# Patient Record
Sex: FEMALE | Race: WHITE | NOT HISPANIC OR LATINO | Employment: OTHER | URBAN - METROPOLITAN AREA
[De-identification: names, ages, dates, MRNs, and addresses within clinical notes are randomized per-mention and may not be internally consistent; named-entity substitution may affect disease eponyms.]

---

## 2023-11-10 ENCOUNTER — APPOINTMENT (OUTPATIENT)
Dept: RADIOLOGY | Facility: HOSPITAL | Age: 78
End: 2023-11-10
Payer: MEDICARE

## 2023-11-10 ENCOUNTER — PHARMACY VISIT (OUTPATIENT)
Dept: PHARMACY | Facility: CLINIC | Age: 78
End: 2023-11-10

## 2023-11-10 ENCOUNTER — HOSPITAL ENCOUNTER (EMERGENCY)
Facility: HOSPITAL | Age: 78
Discharge: HOME | End: 2023-11-10
Payer: MEDICARE

## 2023-11-10 VITALS
HEIGHT: 64 IN | HEART RATE: 82 BPM | WEIGHT: 165 LBS | OXYGEN SATURATION: 98 % | RESPIRATION RATE: 16 BRPM | SYSTOLIC BLOOD PRESSURE: 171 MMHG | TEMPERATURE: 97.2 F | DIASTOLIC BLOOD PRESSURE: 89 MMHG | BODY MASS INDEX: 28.17 KG/M2

## 2023-11-10 DIAGNOSIS — N39.0 ACUTE UTI: ICD-10-CM

## 2023-11-10 DIAGNOSIS — S39.012A STRAIN OF LUMBAR REGION, INITIAL ENCOUNTER: Primary | ICD-10-CM

## 2023-11-10 LAB
APPEARANCE UR: CLEAR
BILIRUB UR STRIP.AUTO-MCNC: NEGATIVE MG/DL
COLOR UR: YELLOW
GLUCOSE UR STRIP.AUTO-MCNC: NEGATIVE MG/DL
HOLD SPECIMEN: NORMAL
KETONES UR STRIP.AUTO-MCNC: NEGATIVE MG/DL
LEUKOCYTE ESTERASE UR QL STRIP.AUTO: ABNORMAL
NITRITE UR QL STRIP.AUTO: NEGATIVE
PH UR STRIP.AUTO: 6 [PH]
PROT UR STRIP.AUTO-MCNC: NEGATIVE MG/DL
RBC # UR STRIP.AUTO: NEGATIVE /UL
RBC #/AREA URNS AUTO: NORMAL /HPF
SP GR UR STRIP.AUTO: 1.01
SQUAMOUS #/AREA URNS AUTO: NORMAL /HPF
UROBILINOGEN UR STRIP.AUTO-MCNC: 2 MG/DL
WBC #/AREA URNS AUTO: NORMAL /HPF

## 2023-11-10 PROCEDURE — 99285 EMERGENCY DEPT VISIT HI MDM: CPT | Mod: 25

## 2023-11-10 PROCEDURE — 72100 X-RAY EXAM L-S SPINE 2/3 VWS: CPT | Performed by: RADIOLOGY

## 2023-11-10 PROCEDURE — 2500000004 HC RX 250 GENERAL PHARMACY W/ HCPCS (ALT 636 FOR OP/ED): Performed by: NURSE PRACTITIONER

## 2023-11-10 PROCEDURE — 81001 URINALYSIS AUTO W/SCOPE: CPT | Performed by: NURSE PRACTITIONER

## 2023-11-10 PROCEDURE — 99283 EMERGENCY DEPT VISIT LOW MDM: CPT | Mod: 25

## 2023-11-10 PROCEDURE — 72100 X-RAY EXAM L-S SPINE 2/3 VWS: CPT

## 2023-11-10 PROCEDURE — 2500000005 HC RX 250 GENERAL PHARMACY W/O HCPCS: Performed by: NURSE PRACTITIONER

## 2023-11-10 PROCEDURE — RXMED WILLOW AMBULATORY MEDICATION CHARGE

## 2023-11-10 PROCEDURE — 87086 URINE CULTURE/COLONY COUNT: CPT | Mod: PORLAB | Performed by: NURSE PRACTITIONER

## 2023-11-10 PROCEDURE — 96372 THER/PROPH/DIAG INJ SC/IM: CPT

## 2023-11-10 RX ORDER — LIDOCAINE 50 MG/G
1 PATCH TOPICAL DAILY
Qty: 10 PATCH | Refills: 0 | Status: SHIPPED | OUTPATIENT
Start: 2023-11-10

## 2023-11-10 RX ORDER — METHOCARBAMOL 500 MG/1
500 TABLET, FILM COATED ORAL 2 TIMES DAILY
Qty: 20 TABLET | Refills: 0 | Status: SHIPPED | OUTPATIENT
Start: 2023-11-10 | End: 2023-11-20

## 2023-11-10 RX ORDER — LIDOCAINE 560 MG/1
1 PATCH PERCUTANEOUS; TOPICAL; TRANSDERMAL ONCE
Status: DISCONTINUED | OUTPATIENT
Start: 2023-11-10 | End: 2023-11-10 | Stop reason: HOSPADM

## 2023-11-10 RX ORDER — KETOROLAC TROMETHAMINE 30 MG/ML
15 INJECTION, SOLUTION INTRAMUSCULAR; INTRAVENOUS ONCE
Status: COMPLETED | OUTPATIENT
Start: 2023-11-10 | End: 2023-11-10

## 2023-11-10 RX ORDER — PREDNISONE 20 MG/1
40 TABLET ORAL DAILY
Qty: 10 TABLET | Refills: 0 | Status: SHIPPED | OUTPATIENT
Start: 2023-11-10 | End: 2023-11-15

## 2023-11-10 RX ORDER — ORPHENADRINE CITRATE 30 MG/ML
30 INJECTION INTRAMUSCULAR; INTRAVENOUS ONCE
Status: COMPLETED | OUTPATIENT
Start: 2023-11-10 | End: 2023-11-10

## 2023-11-10 RX ADMIN — ORPHENADRINE CITRATE 30 MG: 60 INJECTION INTRAMUSCULAR; INTRAVENOUS at 09:05

## 2023-11-10 RX ADMIN — LIDOCAINE 1 PATCH: 4 PATCH TOPICAL at 09:06

## 2023-11-10 RX ADMIN — KETOROLAC TROMETHAMINE 15 MG: 30 INJECTION, SOLUTION INTRAMUSCULAR at 09:04

## 2023-11-10 ASSESSMENT — PAIN - FUNCTIONAL ASSESSMENT: PAIN_FUNCTIONAL_ASSESSMENT: 0-10

## 2023-11-10 ASSESSMENT — COLUMBIA-SUICIDE SEVERITY RATING SCALE - C-SSRS
2. HAVE YOU ACTUALLY HAD ANY THOUGHTS OF KILLING YOURSELF?: NO
6. HAVE YOU EVER DONE ANYTHING, STARTED TO DO ANYTHING, OR PREPARED TO DO ANYTHING TO END YOUR LIFE?: NO
1. IN THE PAST MONTH, HAVE YOU WISHED YOU WERE DEAD OR WISHED YOU COULD GO TO SLEEP AND NOT WAKE UP?: NO

## 2023-11-10 ASSESSMENT — PAIN SCALES - GENERAL: PAINLEVEL_OUTOF10: 5 - MODERATE PAIN

## 2023-11-10 ASSESSMENT — PAIN DESCRIPTION - PAIN TYPE: TYPE: ACUTE PAIN

## 2023-11-10 ASSESSMENT — PAIN DESCRIPTION - LOCATION: LOCATION: BACK

## 2023-11-10 NOTE — ED PROVIDER NOTES
HPI   No chief complaint on file.      This is a 78-year-old  female presenting to the emergency room with complaints of lower back pain for the past 2 weeks.  The patient denies any traumatic injury.  The pain radiates across the entire lower back.  She denies any alteration in her urine or bowel function.  She reports that whenever she has back pain she has been told that she has had a urinary tract infection.  The patient took Tylenol with no relief of her pain symptoms.  She has a history of thoracic back pain from prior injury.  She denies any strenuous activity.  She is not experience any fever or cold-like symptoms.  She denies any spinal injections.                          No data recorded                Patient History   History reviewed. No pertinent past medical history.  History reviewed. No pertinent surgical history.  No family history on file.  Social History     Tobacco Use    Smoking status: Not on file    Smokeless tobacco: Not on file   Substance Use Topics    Alcohol use: Not on file    Drug use: Not on file       Physical Exam   ED Triage Vitals [11/10/23 0807]   Temp Heart Rate Resp BP   36.2 °C (97.2 °F) 82 16 171/89      SpO2 Temp Source Heart Rate Source Patient Position   98 % Temporal -- --      BP Location FiO2 (%)     -- --       Physical Exam  Vitals and nursing note reviewed.   HENT:      Head: Normocephalic and atraumatic.      Right Ear: External ear normal.      Mouth/Throat:      Pharynx: Oropharynx is clear.   Eyes:      Conjunctiva/sclera: Conjunctivae normal.   Cardiovascular:      Rate and Rhythm: Normal rate and regular rhythm.      Pulses: Normal pulses.      Heart sounds: Normal heart sounds.   Abdominal:      General: Bowel sounds are normal.      Palpations: Abdomen is soft.   Musculoskeletal:      Cervical back: Normal range of motion.      Comments: The patient has midline lower and paraspinal tenderness with a negative straight leg test.  The patient also has  paravertebral muscle tenderness bilaterally to the lumbar spinal region.  No cervical thoracic spinal tenderness.  Moves all extremities with normal range of motion and muscle strength.  Gait is stable.   Skin:     General: Skin is warm and dry.   Neurological:      General: No focal deficit present.      Mental Status: She is alert and oriented to person, place, and time.   Psychiatric:         Mood and Affect: Mood normal.         ED Course & MDM   Diagnoses as of 11/10/23 1142   Strain of lumbar region, initial encounter       Medical Decision Making  Patient was seen and evaluated by the nurse practitioner, Doreen Joshua.  The patient is presenting to the emergency room with complaints of lower back pain.  She denies any specific injury.  She states that she does assist her  who has physical limitations.  She might of strained it at that time.  The patient has no evidence of cauda equina syndrome or neurological deficit.  An x-ray of the lumbar spine show discogenic degenerative changes with no acute fracture or subluxation.  The patient was able to ambulate without difficulties.  She was medicated with 15 mg of Toradol IM, 30 mg of Flexeril IM, and a Lidoderm patch was placed on the affected area.  She reported some improvement of her pain symptoms.  The patient was educated on rice therapy and is to avoid any activities that will exacerbate her pain.  She was provided prescriptions for Robaxin, prednisone, and Lidoderm patches for home administration.  She is to follow-up with her primary care physician in the next 2 to 3 days.  She is to return if worse in any way.  The patient was discharged in stable condition with computer discharge instructions given.  The patient was agreeable with discharge plan.        Procedure  Procedures     XANDER Dupont-RUCHI  11/10/23 1141

## 2023-11-13 LAB — BACTERIA UR CULT: ABNORMAL

## 2023-11-14 ENCOUNTER — PHARMACY VISIT (OUTPATIENT)
Dept: PHARMACY | Facility: CLINIC | Age: 78
End: 2023-11-14
Payer: COMMERCIAL

## 2023-11-14 ENCOUNTER — TELEPHONE (OUTPATIENT)
Dept: PHARMACY | Facility: HOSPITAL | Age: 78
End: 2023-11-14
Payer: MEDICARE

## 2023-11-14 PROCEDURE — RXMED WILLOW AMBULATORY MEDICATION CHARGE

## 2023-11-14 RX ORDER — CEFUROXIME AXETIL 500 MG/1
500 TABLET ORAL 2 TIMES DAILY
Qty: 20 TABLET | Refills: 0 | Status: SHIPPED | OUTPATIENT
Start: 2023-11-14 | End: 2023-11-24

## 2023-11-14 NOTE — PROGRESS NOTES
EDPD Note: Initiation     Contacted Jenifer Means regarding a positive urine culture that was taken during their recent emergency room visit. I completed education with patient . The patient was not being treated appropriately. Reached out to the [facility] inpatient pharmacist due to lack of drugs susceptible to the bug found in the urine, Streptococcus bovis/gallolyticus group. Urine culture came back only susceptible to vancomycin and ceftriaxone and intermediate for penicillins and tetracylines. Patient stated having some back pain which is usual with her UTIs. I potentially wanted to send her back to the ED. The [facility] inpatient pharmacist reached out to the nurse practitioner who sent the patient a prescription for cefuroxime to the [place] outpatient pharmacy. Made the patient aware of the results and counseled her on the new medication that was sent. Encouraged patient to return to the ED, PCP, or urgent care if the symptoms worsen or do not resolve after treatment. No further follow up needed from EDPD Team.     Drug: Cefuroxime 500 mg   Sig: Take 1 tablet by mouth twice daily for 10 days  Qty: 20  Days Supply: 10    If there are any other questions for the ED Post-Discharge Culture Follow Up Team, please contact [phone]. Fax: [phone].    Merissa Durand, RazaD

## 2024-11-15 ENCOUNTER — HOSPITAL ENCOUNTER (EMERGENCY)
Facility: HOSPITAL | Age: 79
Discharge: HOME | End: 2024-11-15
Attending: STUDENT IN AN ORGANIZED HEALTH CARE EDUCATION/TRAINING PROGRAM
Payer: MEDICARE

## 2024-11-15 ENCOUNTER — PHARMACY VISIT (OUTPATIENT)
Dept: PHARMACY | Facility: CLINIC | Age: 79
End: 2024-11-15
Payer: COMMERCIAL

## 2024-11-15 ENCOUNTER — APPOINTMENT (OUTPATIENT)
Dept: RADIOLOGY | Facility: HOSPITAL | Age: 79
End: 2024-11-15
Payer: MEDICARE

## 2024-11-15 VITALS
RESPIRATION RATE: 16 BRPM | WEIGHT: 176 LBS | OXYGEN SATURATION: 97 % | HEART RATE: 70 BPM | BODY MASS INDEX: 29.32 KG/M2 | HEIGHT: 65 IN | DIASTOLIC BLOOD PRESSURE: 70 MMHG | SYSTOLIC BLOOD PRESSURE: 173 MMHG | TEMPERATURE: 97.5 F

## 2024-11-15 DIAGNOSIS — J43.9 PULMONARY EMPHYSEMA, UNSPECIFIED EMPHYSEMA TYPE (MULTI): Primary | ICD-10-CM

## 2024-11-15 LAB
ANION GAP SERPL CALC-SCNC: 12 MMOL/L (ref 10–20)
BASOPHILS # BLD AUTO: 0.04 X10*3/UL (ref 0–0.1)
BASOPHILS NFR BLD AUTO: 0.8 %
BUN SERPL-MCNC: 12 MG/DL (ref 6–23)
CALCIUM SERPL-MCNC: 9.5 MG/DL (ref 8.6–10.3)
CHLORIDE SERPL-SCNC: 97 MMOL/L (ref 98–107)
CO2 SERPL-SCNC: 31 MMOL/L (ref 21–32)
CREAT SERPL-MCNC: 0.83 MG/DL (ref 0.5–1.05)
EGFRCR SERPLBLD CKD-EPI 2021: 73 ML/MIN/1.73M*2
EOSINOPHIL # BLD AUTO: 0.05 X10*3/UL (ref 0–0.4)
EOSINOPHIL NFR BLD AUTO: 1 %
ERYTHROCYTE [DISTWIDTH] IN BLOOD BY AUTOMATED COUNT: 13.7 % (ref 11.5–14.5)
FLUAV RNA RESP QL NAA+PROBE: NOT DETECTED
FLUBV RNA RESP QL NAA+PROBE: NOT DETECTED
GLUCOSE SERPL-MCNC: 103 MG/DL (ref 74–99)
HCT VFR BLD AUTO: 38.9 % (ref 36–46)
HGB BLD-MCNC: 12.6 G/DL (ref 12–16)
IMM GRANULOCYTES # BLD AUTO: 0.03 X10*3/UL (ref 0–0.5)
IMM GRANULOCYTES NFR BLD AUTO: 0.6 % (ref 0–0.9)
LACTATE SERPL-SCNC: 1 MMOL/L (ref 0.4–2)
LYMPHOCYTES # BLD AUTO: 1.45 X10*3/UL (ref 0.8–3)
LYMPHOCYTES NFR BLD AUTO: 29.8 %
MCH RBC QN AUTO: 26.8 PG (ref 26–34)
MCHC RBC AUTO-ENTMCNC: 32.4 G/DL (ref 32–36)
MCV RBC AUTO: 83 FL (ref 80–100)
MONOCYTES # BLD AUTO: 0.44 X10*3/UL (ref 0.05–0.8)
MONOCYTES NFR BLD AUTO: 9.1 %
NEUTROPHILS # BLD AUTO: 2.85 X10*3/UL (ref 1.6–5.5)
NEUTROPHILS NFR BLD AUTO: 58.7 %
NRBC BLD-RTO: 0 /100 WBCS (ref 0–0)
PLATELET # BLD AUTO: 282 X10*3/UL (ref 150–450)
POTASSIUM SERPL-SCNC: 3.6 MMOL/L (ref 3.5–5.3)
RBC # BLD AUTO: 4.71 X10*6/UL (ref 4–5.2)
RSV RNA RESP QL NAA+PROBE: NOT DETECTED
SARS-COV-2 RNA RESP QL NAA+PROBE: NOT DETECTED
SODIUM SERPL-SCNC: 136 MMOL/L (ref 136–145)
WBC # BLD AUTO: 4.9 X10*3/UL (ref 4.4–11.3)

## 2024-11-15 PROCEDURE — 85025 COMPLETE CBC W/AUTO DIFF WBC: CPT | Performed by: NURSE PRACTITIONER

## 2024-11-15 PROCEDURE — 36415 COLL VENOUS BLD VENIPUNCTURE: CPT | Performed by: NURSE PRACTITIONER

## 2024-11-15 PROCEDURE — 71046 X-RAY EXAM CHEST 2 VIEWS: CPT | Mod: FOREIGN READ | Performed by: RADIOLOGY

## 2024-11-15 PROCEDURE — 94640 AIRWAY INHALATION TREATMENT: CPT

## 2024-11-15 PROCEDURE — 80048 BASIC METABOLIC PNL TOTAL CA: CPT | Performed by: NURSE PRACTITIONER

## 2024-11-15 PROCEDURE — 83605 ASSAY OF LACTIC ACID: CPT | Performed by: NURSE PRACTITIONER

## 2024-11-15 PROCEDURE — 2500000002 HC RX 250 W HCPCS SELF ADMINISTERED DRUGS (ALT 637 FOR MEDICARE OP, ALT 636 FOR OP/ED): Performed by: NURSE PRACTITIONER

## 2024-11-15 PROCEDURE — 87635 SARS-COV-2 COVID-19 AMP PRB: CPT | Performed by: NURSE PRACTITIONER

## 2024-11-15 PROCEDURE — 71046 X-RAY EXAM CHEST 2 VIEWS: CPT

## 2024-11-15 PROCEDURE — 99283 EMERGENCY DEPT VISIT LOW MDM: CPT | Mod: 25

## 2024-11-15 PROCEDURE — RXMED WILLOW AMBULATORY MEDICATION CHARGE

## 2024-11-15 RX ORDER — ALBUTEROL SULFATE 90 UG/1
2 INHALANT RESPIRATORY (INHALATION) EVERY 4 HOURS PRN
Qty: 6.7 G | Refills: 0 | Status: SHIPPED | OUTPATIENT
Start: 2024-11-15 | End: 2024-12-15

## 2024-11-15 RX ORDER — IPRATROPIUM BROMIDE AND ALBUTEROL SULFATE 2.5; .5 MG/3ML; MG/3ML
3 SOLUTION RESPIRATORY (INHALATION) ONCE
Status: COMPLETED | OUTPATIENT
Start: 2024-11-15 | End: 2024-11-15

## 2024-11-15 RX ORDER — GUAIFENESIN 600 MG/1
600 TABLET, EXTENDED RELEASE ORAL 2 TIMES DAILY
Qty: 14 TABLET | Refills: 0 | Status: SHIPPED | OUTPATIENT
Start: 2024-11-15 | End: 2024-11-22

## 2024-11-15 RX ORDER — BENZONATATE 100 MG/1
100 CAPSULE ORAL 3 TIMES DAILY PRN
Qty: 21 CAPSULE | Refills: 0 | Status: SHIPPED | OUTPATIENT
Start: 2024-11-15 | End: 2024-11-22

## 2024-11-15 ASSESSMENT — LIFESTYLE VARIABLES
EVER HAD A DRINK FIRST THING IN THE MORNING TO STEADY YOUR NERVES TO GET RID OF A HANGOVER: NO
EVER FELT BAD OR GUILTY ABOUT YOUR DRINKING: NO
TOTAL SCORE: 0
HAVE PEOPLE ANNOYED YOU BY CRITICIZING YOUR DRINKING: NO
HAVE YOU EVER FELT YOU SHOULD CUT DOWN ON YOUR DRINKING: NO

## 2024-11-15 ASSESSMENT — PAIN SCALES - GENERAL: PAINLEVEL_OUTOF10: 6

## 2024-11-15 ASSESSMENT — PAIN DESCRIPTION - LOCATION: LOCATION: BACK

## 2024-11-15 ASSESSMENT — COLUMBIA-SUICIDE SEVERITY RATING SCALE - C-SSRS
2. HAVE YOU ACTUALLY HAD ANY THOUGHTS OF KILLING YOURSELF?: NO
1. IN THE PAST MONTH, HAVE YOU WISHED YOU WERE DEAD OR WISHED YOU COULD GO TO SLEEP AND NOT WAKE UP?: NO
6. HAVE YOU EVER DONE ANYTHING, STARTED TO DO ANYTHING, OR PREPARED TO DO ANYTHING TO END YOUR LIFE?: NO

## 2024-11-15 ASSESSMENT — PAIN - FUNCTIONAL ASSESSMENT: PAIN_FUNCTIONAL_ASSESSMENT: 0-10

## 2024-11-15 ASSESSMENT — PAIN DESCRIPTION - DIRECTION: RADIATING_TOWARDS: HIP

## 2024-11-15 NOTE — ED PROVIDER NOTES
Chief Complaint   Patient presents with    Recurrent Pneumonia     Recent dx UTI and pneumonia.  On ABX.  Told to go to ED if sx worsening.  Pt and daughter report worsening pain and respiratory SX.       HPI       79 year old female presents to the Emergency Department today complaining of a 1 week history of nasal congestion, postnasal drip, shortness of breath, and a cough that is occasionally productive of clear sputum. Chest pain only present with coughing. Denies any associated fever, chills, headache, neck pain, abdominal pain, nausea, vomiting, diarrhea, constipation, or urinary symptoms. Recently evaluated at an urgent care and diagnosed with pneumonia and an UTI. Placed on Augmentin and Zithromax. States that the cough has gotten worse.       History provided by:  Patient             Patient History   No past medical history on file.  No past surgical history on file.  No family history on file.  Social History     Tobacco Use    Smoking status: Not on file    Smokeless tobacco: Not on file   Substance Use Topics    Alcohol use: Not on file    Drug use: Not on file           Physical Exam  Constitutional:       Appearance: Normal appearance.   HENT:      Head: Normocephalic.      Right Ear: External ear normal.      Left Ear: External ear normal.      Nose: Nose normal.      Mouth/Throat:      Lips: Pink.      Mouth: Mucous membranes are moist.      Dentition: No dental caries.      Pharynx: Oropharynx is clear. Uvula midline. No oropharyngeal exudate or posterior oropharyngeal erythema.      Tonsils: No tonsillar exudate. 1+ on the right. 1+ on the left.   Eyes:      Conjunctiva/sclera: Conjunctivae normal.      Pupils: Pupils are equal, round, and reactive to light.   Cardiovascular:      Rate and Rhythm: Normal rate and regular rhythm.      Heart sounds: No murmur heard.     No friction rub. No gallop.   Pulmonary:      Effort: Pulmonary effort is normal. No respiratory distress.      Breath sounds:  Normal breath sounds. No stridor. No wheezing, rhonchi or rales.   Neurological:      Mental Status: She is alert.         Labs Reviewed   BASIC METABOLIC PANEL - Abnormal       Result Value    Glucose 103 (*)     Sodium 136      Potassium 3.6      Chloride 97 (*)     Bicarbonate 31      Anion Gap 12      Urea Nitrogen 12      Creatinine 0.83      eGFR 73      Calcium 9.5     LACTATE - Normal    Lactate 1.0      Narrative:     Venipuncture immediately after or during the administration of Metamizole may lead to falsely low results. Testing should be performed immediately prior to Metamizole dosing.   SARS-COV-2 PCR - Normal    Coronavirus 2019, PCR Not Detected      Narrative:     This assay has received FDA Emergency Use Authorization (EUA) and is only authorized for the duration of time that circumstances exist to justify the authorization of the emergency use of in vitro diagnostic tests for the detection of SARS-CoV-2 virus and/or diagnosis of COVID-19 infection under section 564(b)(1) of the Act, 21 U.S.C. 360bbb-3(b)(1). This assay is an in vitro diagnostic nucleic acid amplification test for the qualitative detection of SARS-CoV-2 from nasopharyngeal specimens and has been validated for use at [facility]. Negative results do not preclude COVID-19 infections and should not be used as the sole basis for diagnosis, treatment, or other management decisions.     INFLUENZA A AND B PCR - Normal    Flu A Result Not Detected      Flu B Result Not Detected      Narrative:     This assay is an in vitro diagnostic multiplex nucleic acid amplification test for the detection and discrimination of Influenza A & B from nasopharyngeal specimens, and has been validated for use at [facility]. Negative results do not preclude Influenza A/B infections, and should not be used as the sole basis for diagnosis, treatment, or other management decisions. If Influenza A/B and RSV PCR  results are negative, testing for Parainfluenza virus, Adenovirus and Metapneumovirus is routinely performed for [facility] pediatric oncology and intensive care inpatients, and is available on other patients by placing an add-on request.   RSV PCR - Normal    RSV PCR Not Detected      Narrative:     This assay is an FDA-cleared, in vitro diagnostic nucleic acid amplification test for the detection of RSV from nasopharyngeal specimens, and has been validated for use at [facility]. Negative results do not preclude RSV infections, and should not be used as the sole basis for diagnosis, treatment, or other management decisions. If Influenza A/B and RSV PCR results are negative, testing for Parainfluenza virus, Adenovirus and Metapneumovirus is routinely performed for pediatric oncology and intensive care inpatients at [facility], and is available on other patients by placing an add-on request.       CBC WITH AUTO DIFFERENTIAL    WBC 4.9      nRBC 0.0      RBC 4.71      Hemoglobin 12.6      Hematocrit 38.9      MCV 83      MCH 26.8      MCHC 32.4      RDW 13.7      Platelets 282      Neutrophils % 58.7      Immature Granulocytes %, Automated 0.6      Lymphocytes % 29.8      Monocytes % 9.1      Eosinophils % 1.0      Basophils % 0.8      Neutrophils Absolute 2.85      Immature Granulocytes Absolute, Automated 0.03      Lymphocytes Absolute 1.45      Monocytes Absolute 0.44      Eosinophils Absolute 0.05      Basophils Absolute 0.04         XR chest 2 views   Final Result   Calcified granuloma.  Emphysematous changes.  No other acute process.   Signed by Jose Gamble DO               ED Course & MDM   Diagnoses as of 11/15/24 8664   Pulmonary emphysema, unspecified emphysema type (Multi)           Medical Decision Making  Patient was seen and evaluated by Dr. Lopez. Given a Duoneb with improvement in her shortness of breath. Repeat lung sounds remain unchanged. Saline lock was established with  labs drawn and results as above. Blood counts, electrolytes, kidney function, and lactate were unremarkable. Influenza, COVID, and RSV were all negative. CXR shows a calcified granuloma with emphysematous changes and no other acute process. We feel that her symptoms are secondary to a COPD exacerbation versus a viral URI. She may continue to take the Zithromax and Augmentin as previously directed. Allergic to Prednisone and does not want any steroids. Given prescriptions for Mucinex and an Albuterol inhaler. Follow up with their doctor in 3 days. Given a referral to pulmonology as well. Return if worse in any way. Discharged in stable condition with computer instructions.    Diagnostic Impression:     1. Acute exacerbation of COPD    2. Prescription therapy            Your medication list        ASK your doctor about these medications        Instructions Last Dose Given Next Dose Due   lidocaine 5 % patch  Commonly known as: Lidoderm      Place 1 patch on the skin in affected area and leave on for 12 hours. Remove for 12 hours       methocarbamol 500 mg tablet  Commonly known as: Robaxin      Take 1 tablet (500 mg) by mouth 2 times a day for 10 days.                  Procedure  Procedures     Messi Guthrie, XANDER-CNP  11/15/24 0361

## 2024-11-15 NOTE — ED TRIAGE NOTES
Pt recently diagnosed at urgent care with pneumonia and UTI.  Started abx on Tuesday of this week.  Was told to go to the hospital if symptoms worsened.  Per pt and daughter, pt has increase in pain and worsening upper respiratory symptoms, including cough.  A&O x4, ambulated independently to tx area.

## 2025-03-03 ENCOUNTER — HOSPITAL ENCOUNTER (INPATIENT)
Facility: HOSPITAL | Age: 80
LOS: 5 days | Discharge: HOME | End: 2025-03-09
Attending: STUDENT IN AN ORGANIZED HEALTH CARE EDUCATION/TRAINING PROGRAM | Admitting: INTERNAL MEDICINE
Payer: MEDICARE

## 2025-03-03 ENCOUNTER — APPOINTMENT (OUTPATIENT)
Dept: RADIOLOGY | Facility: HOSPITAL | Age: 80
DRG: 871 | End: 2025-03-03
Payer: MEDICARE

## 2025-03-03 DIAGNOSIS — J13 PNEUMONIA OF BOTH LOWER LOBES DUE TO STREPTOCOCCUS PNEUMONIAE (CMS-HCC): ICD-10-CM

## 2025-03-03 DIAGNOSIS — J96.01 ACUTE RESPIRATORY FAILURE WITH HYPOXIA (MULTI): ICD-10-CM

## 2025-03-03 DIAGNOSIS — J18.9 PNEUMONIA DUE TO INFECTIOUS ORGANISM, UNSPECIFIED LATERALITY, UNSPECIFIED PART OF LUNG: ICD-10-CM

## 2025-03-03 DIAGNOSIS — J11.1 FLU: Primary | ICD-10-CM

## 2025-03-03 LAB
ALBUMIN SERPL BCP-MCNC: 3.4 G/DL (ref 3.4–5)
ALP SERPL-CCNC: 87 U/L (ref 33–136)
ALT SERPL W P-5'-P-CCNC: 23 U/L (ref 7–45)
ANION GAP BLDV CALCULATED.4IONS-SCNC: 6 MMOL/L (ref 10–25)
ANION GAP SERPL CALC-SCNC: 12 MMOL/L (ref 10–20)
AST SERPL W P-5'-P-CCNC: 24 U/L (ref 9–39)
BASE EXCESS BLDV CALC-SCNC: 9.5 MMOL/L (ref -2–3)
BASOPHILS # BLD AUTO: 0.12 X10*3/UL (ref 0–0.1)
BASOPHILS NFR BLD AUTO: 0.9 %
BILIRUB SERPL-MCNC: 0.9 MG/DL (ref 0–1.2)
BNP SERPL-MCNC: 143 PG/ML (ref 0–99)
BODY TEMPERATURE: 37 DEGREES CELSIUS
BUN SERPL-MCNC: 18 MG/DL (ref 6–23)
CA-I BLDV-SCNC: 1.16 MMOL/L (ref 1.1–1.33)
CALCIUM SERPL-MCNC: 9.1 MG/DL (ref 8.6–10.3)
CARDIAC TROPONIN I PNL SERPL HS: 21 NG/L (ref 0–13)
CARDIAC TROPONIN I PNL SERPL HS: 23 NG/L (ref 0–13)
CHLORIDE BLDV-SCNC: 91 MMOL/L (ref 98–107)
CHLORIDE SERPL-SCNC: 91 MMOL/L (ref 98–107)
CO2 SERPL-SCNC: 33 MMOL/L (ref 21–32)
CREAT SERPL-MCNC: 0.69 MG/DL (ref 0.5–1.05)
EGFRCR SERPLBLD CKD-EPI 2021: 90 ML/MIN/1.73M*2
EOSINOPHIL # BLD AUTO: 0 X10*3/UL (ref 0–0.4)
EOSINOPHIL NFR BLD AUTO: 0 %
ERYTHROCYTE [DISTWIDTH] IN BLOOD BY AUTOMATED COUNT: 13.8 % (ref 11.5–14.5)
FLUAV RNA RESP QL NAA+PROBE: DETECTED
FLUBV RNA RESP QL NAA+PROBE: NOT DETECTED
GLUCOSE BLDV-MCNC: 126 MG/DL (ref 74–99)
GLUCOSE SERPL-MCNC: 119 MG/DL (ref 74–99)
HCO3 BLDV-SCNC: 35.3 MMOL/L (ref 22–26)
HCT VFR BLD AUTO: 37.2 % (ref 36–46)
HCT VFR BLD EST: 38 % (ref 36–46)
HGB BLD-MCNC: 12.1 G/DL (ref 12–16)
HGB BLDV-MCNC: 12.5 G/DL (ref 12–16)
IMM GRANULOCYTES # BLD AUTO: 0.4 X10*3/UL (ref 0–0.5)
IMM GRANULOCYTES NFR BLD AUTO: 3.1 % (ref 0–0.9)
INHALED O2 CONCENTRATION: 36 %
LACTATE BLDV-SCNC: 1.8 MMOL/L (ref 0.4–2)
LIPASE SERPL-CCNC: 10 U/L (ref 9–82)
LYMPHOCYTES # BLD AUTO: 0.42 X10*3/UL (ref 0.8–3)
LYMPHOCYTES NFR BLD AUTO: 3.2 %
MAGNESIUM SERPL-MCNC: 1.97 MG/DL (ref 1.6–2.4)
MCH RBC QN AUTO: 26.4 PG (ref 26–34)
MCHC RBC AUTO-ENTMCNC: 32.5 G/DL (ref 32–36)
MCV RBC AUTO: 81 FL (ref 80–100)
MONOCYTES # BLD AUTO: 0.75 X10*3/UL (ref 0.05–0.8)
MONOCYTES NFR BLD AUTO: 5.8 %
NEUTROPHILS # BLD AUTO: 11.28 X10*3/UL (ref 1.6–5.5)
NEUTROPHILS NFR BLD AUTO: 87 %
NRBC BLD-RTO: 0 /100 WBCS (ref 0–0)
OXYHGB MFR BLDV: 35.8 % (ref 45–75)
PCO2 BLDV: 52 MM HG (ref 41–51)
PH BLDV: 7.44 PH (ref 7.33–7.43)
PLATELET # BLD AUTO: 379 X10*3/UL (ref 150–450)
PO2 BLDV: 25 MM HG (ref 35–45)
POTASSIUM BLDV-SCNC: 3.6 MMOL/L (ref 3.5–5.3)
POTASSIUM SERPL-SCNC: 3.6 MMOL/L (ref 3.5–5.3)
PROT SERPL-MCNC: 7.2 G/DL (ref 6.4–8.2)
RBC # BLD AUTO: 4.58 X10*6/UL (ref 4–5.2)
SAO2 % BLDV: 37 % (ref 45–75)
SARS-COV-2 RNA RESP QL NAA+PROBE: NOT DETECTED
SODIUM BLDV-SCNC: 129 MMOL/L (ref 136–145)
SODIUM SERPL-SCNC: 132 MMOL/L (ref 136–145)
WBC # BLD AUTO: 13 X10*3/UL (ref 4.4–11.3)

## 2025-03-03 PROCEDURE — 83690 ASSAY OF LIPASE: CPT | Performed by: STUDENT IN AN ORGANIZED HEALTH CARE EDUCATION/TRAINING PROGRAM

## 2025-03-03 PROCEDURE — 84484 ASSAY OF TROPONIN QUANT: CPT | Performed by: STUDENT IN AN ORGANIZED HEALTH CARE EDUCATION/TRAINING PROGRAM

## 2025-03-03 PROCEDURE — 87077 CULTURE AEROBIC IDENTIFY: CPT | Mod: PORLAB | Performed by: STUDENT IN AN ORGANIZED HEALTH CARE EDUCATION/TRAINING PROGRAM

## 2025-03-03 PROCEDURE — 96365 THER/PROPH/DIAG IV INF INIT: CPT

## 2025-03-03 PROCEDURE — 84132 ASSAY OF SERUM POTASSIUM: CPT | Performed by: STUDENT IN AN ORGANIZED HEALTH CARE EDUCATION/TRAINING PROGRAM

## 2025-03-03 PROCEDURE — 36415 COLL VENOUS BLD VENIPUNCTURE: CPT | Performed by: STUDENT IN AN ORGANIZED HEALTH CARE EDUCATION/TRAINING PROGRAM

## 2025-03-03 PROCEDURE — 87641 MR-STAPH DNA AMP PROBE: CPT | Performed by: STUDENT IN AN ORGANIZED HEALTH CARE EDUCATION/TRAINING PROGRAM

## 2025-03-03 PROCEDURE — 71046 X-RAY EXAM CHEST 2 VIEWS: CPT

## 2025-03-03 PROCEDURE — 87636 SARSCOV2 & INF A&B AMP PRB: CPT | Performed by: STUDENT IN AN ORGANIZED HEALTH CARE EDUCATION/TRAINING PROGRAM

## 2025-03-03 PROCEDURE — 2500000002 HC RX 250 W HCPCS SELF ADMINISTERED DRUGS (ALT 637 FOR MEDICARE OP, ALT 636 FOR OP/ED): Performed by: STUDENT IN AN ORGANIZED HEALTH CARE EDUCATION/TRAINING PROGRAM

## 2025-03-03 PROCEDURE — 71046 X-RAY EXAM CHEST 2 VIEWS: CPT | Mod: FOREIGN READ | Performed by: RADIOLOGY

## 2025-03-03 PROCEDURE — 85025 COMPLETE CBC W/AUTO DIFF WBC: CPT | Performed by: STUDENT IN AN ORGANIZED HEALTH CARE EDUCATION/TRAINING PROGRAM

## 2025-03-03 PROCEDURE — 2500000004 HC RX 250 GENERAL PHARMACY W/ HCPCS (ALT 636 FOR OP/ED): Performed by: STUDENT IN AN ORGANIZED HEALTH CARE EDUCATION/TRAINING PROGRAM

## 2025-03-03 PROCEDURE — 83880 ASSAY OF NATRIURETIC PEPTIDE: CPT | Performed by: STUDENT IN AN ORGANIZED HEALTH CARE EDUCATION/TRAINING PROGRAM

## 2025-03-03 PROCEDURE — 94640 AIRWAY INHALATION TREATMENT: CPT

## 2025-03-03 PROCEDURE — 83735 ASSAY OF MAGNESIUM: CPT | Performed by: STUDENT IN AN ORGANIZED HEALTH CARE EDUCATION/TRAINING PROGRAM

## 2025-03-03 PROCEDURE — 83605 ASSAY OF LACTIC ACID: CPT | Performed by: STUDENT IN AN ORGANIZED HEALTH CARE EDUCATION/TRAINING PROGRAM

## 2025-03-03 PROCEDURE — 99285 EMERGENCY DEPT VISIT HI MDM: CPT | Mod: 25 | Performed by: STUDENT IN AN ORGANIZED HEALTH CARE EDUCATION/TRAINING PROGRAM

## 2025-03-03 RX ORDER — IPRATROPIUM BROMIDE AND ALBUTEROL SULFATE 2.5; .5 MG/3ML; MG/3ML
3 SOLUTION RESPIRATORY (INHALATION) ONCE
Status: COMPLETED | OUTPATIENT
Start: 2025-03-03 | End: 2025-03-03

## 2025-03-03 RX ORDER — LISINOPRIL 40 MG/1
1 TABLET ORAL DAILY
COMMUNITY

## 2025-03-03 RX ORDER — NAPROXEN SODIUM 220 MG/1
1 TABLET, FILM COATED ORAL DAILY
COMMUNITY

## 2025-03-03 RX ORDER — SIMVASTATIN 20 MG/1
1 TABLET, FILM COATED ORAL DAILY
COMMUNITY

## 2025-03-03 RX ORDER — PANTOPRAZOLE SODIUM 40 MG/1
1 TABLET, DELAYED RELEASE ORAL DAILY
COMMUNITY

## 2025-03-03 RX ORDER — HYDROCHLOROTHIAZIDE 12.5 MG/1
12.5 CAPSULE ORAL EVERY 24 HOURS
COMMUNITY

## 2025-03-03 RX ADMIN — IPRATROPIUM BROMIDE AND ALBUTEROL SULFATE 3 ML: 2.5; .5 SOLUTION RESPIRATORY (INHALATION) at 21:34

## 2025-03-03 RX ADMIN — PIPERACILLIN SODIUM AND TAZOBACTAM SODIUM 4.5 G: 4; .5 INJECTION, SOLUTION INTRAVENOUS at 23:42

## 2025-03-03 ASSESSMENT — COLUMBIA-SUICIDE SEVERITY RATING SCALE - C-SSRS
6. HAVE YOU EVER DONE ANYTHING, STARTED TO DO ANYTHING, OR PREPARED TO DO ANYTHING TO END YOUR LIFE?: NO
2. HAVE YOU ACTUALLY HAD ANY THOUGHTS OF KILLING YOURSELF?: NO
1. IN THE PAST MONTH, HAVE YOU WISHED YOU WERE DEAD OR WISHED YOU COULD GO TO SLEEP AND NOT WAKE UP?: NO

## 2025-03-03 ASSESSMENT — PAIN SCALES - GENERAL: PAINLEVEL_OUTOF10: 0 - NO PAIN

## 2025-03-03 ASSESSMENT — PAIN - FUNCTIONAL ASSESSMENT: PAIN_FUNCTIONAL_ASSESSMENT: 0-10

## 2025-03-04 PROBLEM — R79.89 TROPONIN LEVEL ELEVATED: Status: ACTIVE | Noted: 2025-03-04

## 2025-03-04 PROBLEM — J10.1 INFLUENZA A: Status: ACTIVE | Noted: 2025-03-03

## 2025-03-04 PROBLEM — A41.9 SEPSIS (MULTI): Status: ACTIVE | Noted: 2025-03-04

## 2025-03-04 PROBLEM — J15.9 BACTERIAL PNEUMONIA: Status: ACTIVE | Noted: 2025-03-04

## 2025-03-04 PROBLEM — J96.01 ACUTE HYPOXEMIC RESPIRATORY FAILURE (MULTI): Status: ACTIVE | Noted: 2025-03-04

## 2025-03-04 PROBLEM — M54.50 LOW BACK PAIN: Status: ACTIVE | Noted: 2025-03-04

## 2025-03-04 LAB
ANION GAP SERPL CALC-SCNC: 14 MMOL/L (ref 10–20)
APPEARANCE UR: ABNORMAL
ATRIAL RATE: 103 BPM
BACTERIA #/AREA URNS AUTO: ABNORMAL /HPF
BILIRUB UR STRIP.AUTO-MCNC: NEGATIVE MG/DL
BUN SERPL-MCNC: 16 MG/DL (ref 6–23)
CALCIUM SERPL-MCNC: 8.9 MG/DL (ref 8.6–10.3)
CHLORIDE SERPL-SCNC: 91 MMOL/L (ref 98–107)
CO2 SERPL-SCNC: 30 MMOL/L (ref 21–32)
COLOR UR: YELLOW
CREAT SERPL-MCNC: 0.61 MG/DL (ref 0.5–1.05)
EGFRCR SERPLBLD CKD-EPI 2021: >90 ML/MIN/1.73M*2
ERYTHROCYTE [DISTWIDTH] IN BLOOD BY AUTOMATED COUNT: 13.9 % (ref 11.5–14.5)
GLUCOSE SERPL-MCNC: 101 MG/DL (ref 74–99)
GLUCOSE UR STRIP.AUTO-MCNC: ABNORMAL MG/DL
HCT VFR BLD AUTO: 35.7 % (ref 36–46)
HGB BLD-MCNC: 11.6 G/DL (ref 12–16)
HOLD SPECIMEN: NORMAL
HYALINE CASTS #/AREA URNS AUTO: ABNORMAL /LPF
KETONES UR STRIP.AUTO-MCNC: ABNORMAL MG/DL
LACTATE SERPL-SCNC: 1.1 MMOL/L (ref 0.4–2)
LEUKOCYTE ESTERASE UR QL STRIP.AUTO: ABNORMAL
MCH RBC QN AUTO: 26.2 PG (ref 26–34)
MCHC RBC AUTO-ENTMCNC: 32.5 G/DL (ref 32–36)
MCV RBC AUTO: 81 FL (ref 80–100)
MRSA DNA SPEC QL NAA+PROBE: NOT DETECTED
MUCOUS THREADS #/AREA URNS AUTO: ABNORMAL /LPF
NITRITE UR QL STRIP.AUTO: NEGATIVE
NRBC BLD-RTO: 0 /100 WBCS (ref 0–0)
P AXIS: 51 DEGREES
PH UR STRIP.AUTO: 6 [PH]
PLATELET # BLD AUTO: 383 X10*3/UL (ref 150–450)
POTASSIUM SERPL-SCNC: 3.5 MMOL/L (ref 3.5–5.3)
PR INTERVAL: 180 MS
PROT UR STRIP.AUTO-MCNC: ABNORMAL MG/DL
Q ONSET: 249 MS
QRS COUNT: 17 BEATS
QRS DURATION: 75 MS
QT INTERVAL: 343 MS
QTC CALCULATION(BAZETT): 449 MS
QTC FREDERICIA: 411 MS
R AXIS: 43 DEGREES
RBC # BLD AUTO: 4.42 X10*6/UL (ref 4–5.2)
RBC # UR STRIP.AUTO: ABNORMAL MG/DL
RBC #/AREA URNS AUTO: ABNORMAL /HPF
SODIUM SERPL-SCNC: 131 MMOL/L (ref 136–145)
SP GR UR STRIP.AUTO: 1.04
SQUAMOUS #/AREA URNS AUTO: ABNORMAL /HPF
T AXIS: 42 DEGREES
T OFFSET: 421 MS
UROBILINOGEN UR STRIP.AUTO-MCNC: NORMAL MG/DL
VENTRICULAR RATE: 103 BPM
WBC # BLD AUTO: 14.2 X10*3/UL (ref 4.4–11.3)
WBC #/AREA URNS AUTO: ABNORMAL /HPF

## 2025-03-04 PROCEDURE — 2500000004 HC RX 250 GENERAL PHARMACY W/ HCPCS (ALT 636 FOR OP/ED): Performed by: STUDENT IN AN ORGANIZED HEALTH CARE EDUCATION/TRAINING PROGRAM

## 2025-03-04 PROCEDURE — 87086 URINE CULTURE/COLONY COUNT: CPT | Mod: PORLAB | Performed by: STUDENT IN AN ORGANIZED HEALTH CARE EDUCATION/TRAINING PROGRAM

## 2025-03-04 PROCEDURE — 94640 AIRWAY INHALATION TREATMENT: CPT

## 2025-03-04 PROCEDURE — 1210000001 HC SEMI-PRIVATE ROOM DAILY

## 2025-03-04 PROCEDURE — 97161 PT EVAL LOW COMPLEX 20 MIN: CPT | Mod: GP

## 2025-03-04 PROCEDURE — 2500000004 HC RX 250 GENERAL PHARMACY W/ HCPCS (ALT 636 FOR OP/ED): Performed by: INTERNAL MEDICINE

## 2025-03-04 PROCEDURE — 80048 BASIC METABOLIC PNL TOTAL CA: CPT | Performed by: INTERNAL MEDICINE

## 2025-03-04 PROCEDURE — 2500000005 HC RX 250 GENERAL PHARMACY W/O HCPCS: Performed by: STUDENT IN AN ORGANIZED HEALTH CARE EDUCATION/TRAINING PROGRAM

## 2025-03-04 PROCEDURE — 81001 URINALYSIS AUTO W/SCOPE: CPT | Performed by: STUDENT IN AN ORGANIZED HEALTH CARE EDUCATION/TRAINING PROGRAM

## 2025-03-04 PROCEDURE — 85027 COMPLETE CBC AUTOMATED: CPT | Performed by: INTERNAL MEDICINE

## 2025-03-04 PROCEDURE — 99223 1ST HOSP IP/OBS HIGH 75: CPT | Performed by: INTERNAL MEDICINE

## 2025-03-04 PROCEDURE — 2500000002 HC RX 250 W HCPCS SELF ADMINISTERED DRUGS (ALT 637 FOR MEDICARE OP, ALT 636 FOR OP/ED): Performed by: INTERNAL MEDICINE

## 2025-03-04 PROCEDURE — 36415 COLL VENOUS BLD VENIPUNCTURE: CPT | Performed by: INTERNAL MEDICINE

## 2025-03-04 PROCEDURE — 2500000001 HC RX 250 WO HCPCS SELF ADMINISTERED DRUGS (ALT 637 FOR MEDICARE OP): Performed by: INTERNAL MEDICINE

## 2025-03-04 PROCEDURE — 82374 ASSAY BLOOD CARBON DIOXIDE: CPT | Performed by: INTERNAL MEDICINE

## 2025-03-04 PROCEDURE — 97166 OT EVAL MOD COMPLEX 45 MIN: CPT | Mod: GO

## 2025-03-04 RX ORDER — CEFTRIAXONE 2 G/50ML
2 INJECTION, SOLUTION INTRAVENOUS EVERY 24 HOURS
Status: DISCONTINUED | OUTPATIENT
Start: 2025-03-04 | End: 2025-03-09 | Stop reason: HOSPADM

## 2025-03-04 RX ORDER — SODIUM CHLORIDE, SODIUM LACTATE, POTASSIUM CHLORIDE, CALCIUM CHLORIDE 600; 310; 30; 20 MG/100ML; MG/100ML; MG/100ML; MG/100ML
100 INJECTION, SOLUTION INTRAVENOUS CONTINUOUS
Status: ACTIVE | OUTPATIENT
Start: 2025-03-04 | End: 2025-03-05

## 2025-03-04 RX ORDER — ONDANSETRON HYDROCHLORIDE 2 MG/ML
4 INJECTION, SOLUTION INTRAVENOUS EVERY 8 HOURS PRN
Status: DISCONTINUED | OUTPATIENT
Start: 2025-03-04 | End: 2025-03-09 | Stop reason: HOSPADM

## 2025-03-04 RX ORDER — POLYETHYLENE GLYCOL 3350 17 G/17G
17 POWDER, FOR SOLUTION ORAL 2 TIMES DAILY PRN
Status: DISCONTINUED | OUTPATIENT
Start: 2025-03-04 | End: 2025-03-09 | Stop reason: HOSPADM

## 2025-03-04 RX ORDER — GUAIFENESIN/DEXTROMETHORPHAN 100-10MG/5
5 SYRUP ORAL EVERY 4 HOURS PRN
Status: DISCONTINUED | OUTPATIENT
Start: 2025-03-04 | End: 2025-03-09 | Stop reason: HOSPADM

## 2025-03-04 RX ORDER — OSELTAMIVIR PHOSPHATE 75 MG/1
75 CAPSULE ORAL 2 TIMES DAILY
Status: DISCONTINUED | OUTPATIENT
Start: 2025-03-04 | End: 2025-03-05

## 2025-03-04 RX ORDER — DOXYCYCLINE 100 MG/1
100 CAPSULE ORAL EVERY 12 HOURS SCHEDULED
Status: DISCONTINUED | OUTPATIENT
Start: 2025-03-04 | End: 2025-03-05

## 2025-03-04 RX ORDER — ACETAMINOPHEN 160 MG/5ML
650 SOLUTION ORAL EVERY 4 HOURS PRN
Status: DISCONTINUED | OUTPATIENT
Start: 2025-03-04 | End: 2025-03-09 | Stop reason: HOSPADM

## 2025-03-04 RX ORDER — ACETAMINOPHEN 325 MG/1
650 TABLET ORAL EVERY 4 HOURS PRN
Status: DISCONTINUED | OUTPATIENT
Start: 2025-03-04 | End: 2025-03-09 | Stop reason: HOSPADM

## 2025-03-04 RX ORDER — ALUMINUM HYDROXIDE, MAGNESIUM HYDROXIDE, AND SIMETHICONE 1200; 120; 1200 MG/30ML; MG/30ML; MG/30ML
30 SUSPENSION ORAL EVERY 6 HOURS PRN
Status: DISCONTINUED | OUTPATIENT
Start: 2025-03-04 | End: 2025-03-09 | Stop reason: HOSPADM

## 2025-03-04 RX ORDER — LANOLIN ALCOHOL/MO/W.PET/CERES
1000 CREAM (GRAM) TOPICAL DAILY
COMMUNITY

## 2025-03-04 RX ORDER — IPRATROPIUM BROMIDE AND ALBUTEROL SULFATE 2.5; .5 MG/3ML; MG/3ML
3 SOLUTION RESPIRATORY (INHALATION)
Status: DISCONTINUED | OUTPATIENT
Start: 2025-03-04 | End: 2025-03-04

## 2025-03-04 RX ORDER — GUAIFENESIN 600 MG/1
600 TABLET, EXTENDED RELEASE ORAL EVERY 12 HOURS PRN
Status: DISCONTINUED | OUTPATIENT
Start: 2025-03-04 | End: 2025-03-09 | Stop reason: HOSPADM

## 2025-03-04 RX ORDER — TALC
3 POWDER (GRAM) TOPICAL NIGHTLY PRN
Status: DISCONTINUED | OUTPATIENT
Start: 2025-03-04 | End: 2025-03-09 | Stop reason: HOSPADM

## 2025-03-04 RX ORDER — IPRATROPIUM BROMIDE AND ALBUTEROL SULFATE 2.5; .5 MG/3ML; MG/3ML
3 SOLUTION RESPIRATORY (INHALATION) 3 TIMES DAILY
Status: DISCONTINUED | OUTPATIENT
Start: 2025-03-04 | End: 2025-03-09 | Stop reason: HOSPADM

## 2025-03-04 RX ORDER — IPRATROPIUM BROMIDE AND ALBUTEROL SULFATE 2.5; .5 MG/3ML; MG/3ML
3 SOLUTION RESPIRATORY (INHALATION) EVERY 4 HOURS PRN
Status: DISCONTINUED | OUTPATIENT
Start: 2025-03-04 | End: 2025-03-09 | Stop reason: HOSPADM

## 2025-03-04 RX ORDER — ACETAMINOPHEN 650 MG/1
650 SUPPOSITORY RECTAL EVERY 4 HOURS PRN
Status: DISCONTINUED | OUTPATIENT
Start: 2025-03-04 | End: 2025-03-09 | Stop reason: HOSPADM

## 2025-03-04 RX ORDER — ONDANSETRON 4 MG/1
4 TABLET, FILM COATED ORAL EVERY 8 HOURS PRN
Status: DISCONTINUED | OUTPATIENT
Start: 2025-03-04 | End: 2025-03-09 | Stop reason: HOSPADM

## 2025-03-04 RX ADMIN — DOXYCYCLINE 100 MG: 100 CAPSULE ORAL at 08:09

## 2025-03-04 RX ADMIN — VANCOMYCIN HYDROCHLORIDE 2 G: 10 INJECTION, POWDER, LYOPHILIZED, FOR SOLUTION INTRAVENOUS at 05:13

## 2025-03-04 RX ADMIN — DOXYCYCLINE 100 MG: 100 CAPSULE ORAL at 01:34

## 2025-03-04 RX ADMIN — IPRATROPIUM BROMIDE AND ALBUTEROL SULFATE 3 ML: 2.5; .5 SOLUTION RESPIRATORY (INHALATION) at 19:35

## 2025-03-04 RX ADMIN — SODIUM CHLORIDE, SODIUM LACTATE, POTASSIUM CHLORIDE, AND CALCIUM CHLORIDE 100 ML/HR: .6; .31; .03; .02 INJECTION, SOLUTION INTRAVENOUS at 13:55

## 2025-03-04 RX ADMIN — IPRATROPIUM BROMIDE AND ALBUTEROL SULFATE 3 ML: 2.5; .5 SOLUTION RESPIRATORY (INHALATION) at 11:20

## 2025-03-04 RX ADMIN — SODIUM CHLORIDE, SODIUM LACTATE, POTASSIUM CHLORIDE, AND CALCIUM CHLORIDE 100 ML/HR: .6; .31; .03; .02 INJECTION, SOLUTION INTRAVENOUS at 01:34

## 2025-03-04 RX ADMIN — OSELTAMAVIR PHOSPHATE 75 MG: 75 CAPSULE ORAL at 08:09

## 2025-03-04 RX ADMIN — CEFTRIAXONE 2 G: 2 INJECTION, SOLUTION INTRAVENOUS at 08:09

## 2025-03-04 RX ADMIN — DOXYCYCLINE 100 MG: 100 CAPSULE ORAL at 20:11

## 2025-03-04 RX ADMIN — OSELTAMAVIR PHOSPHATE 75 MG: 75 CAPSULE ORAL at 20:11

## 2025-03-04 RX ADMIN — IPRATROPIUM BROMIDE AND ALBUTEROL SULFATE 3 ML: 2.5; .5 SOLUTION RESPIRATORY (INHALATION) at 07:19

## 2025-03-04 SDOH — SOCIAL STABILITY: SOCIAL INSECURITY: WERE YOU ABLE TO COMPLETE ALL THE BEHAVIORAL HEALTH SCREENINGS?: NO

## 2025-03-04 SDOH — ECONOMIC STABILITY: FOOD INSECURITY
HOW HARD IS IT FOR YOU TO PAY FOR THE VERY BASICS LIKE FOOD, HOUSING, MEDICAL CARE, AND HEATING?: PATIENT UNABLE TO ANSWER

## 2025-03-04 SDOH — SOCIAL STABILITY: SOCIAL INSECURITY: DO YOU FEEL ANYONE HAS EXPLOITED OR TAKEN ADVANTAGE OF YOU FINANCIALLY OR OF YOUR PERSONAL PROPERTY?: UNABLE TO ASSESS

## 2025-03-04 SDOH — HEALTH STABILITY: MENTAL HEALTH: HOW OFTEN DO YOU HAVE SIX OR MORE DRINKS ON ONE OCCASION?: NEVER

## 2025-03-04 SDOH — SOCIAL STABILITY: SOCIAL INSECURITY: DO YOU FEEL UNSAFE GOING BACK TO THE PLACE WHERE YOU ARE LIVING?: UNABLE TO ASSESS

## 2025-03-04 SDOH — SOCIAL STABILITY: SOCIAL INSECURITY: HAS ANYONE EVER THREATENED TO HURT YOUR FAMILY OR YOUR PETS?: UNABLE TO ASSESS

## 2025-03-04 SDOH — SOCIAL STABILITY: SOCIAL INSECURITY
WITHIN THE LAST YEAR, HAVE YOU BEEN HUMILIATED OR EMOTIONALLY ABUSED IN OTHER WAYS BY YOUR PARTNER OR EX-PARTNER?: PATIENT UNABLE TO ANSWER

## 2025-03-04 SDOH — SOCIAL STABILITY: SOCIAL INSECURITY: ARE THERE ANY APPARENT SIGNS OF INJURIES/BEHAVIORS THAT COULD BE RELATED TO ABUSE/NEGLECT?: UNABLE TO ASSESS

## 2025-03-04 SDOH — SOCIAL STABILITY: SOCIAL INSECURITY: ABUSE: ADULT

## 2025-03-04 SDOH — SOCIAL STABILITY: SOCIAL INSECURITY: HAVE YOU HAD THOUGHTS OF HARMING ANYONE ELSE?: UNABLE TO ASSESS

## 2025-03-04 SDOH — SOCIAL STABILITY: SOCIAL INSECURITY: WITHIN THE LAST YEAR, HAVE YOU BEEN AFRAID OF YOUR PARTNER OR EX-PARTNER?: PATIENT UNABLE TO ANSWER

## 2025-03-04 SDOH — ECONOMIC STABILITY: TRANSPORTATION INSECURITY
IN THE PAST 12 MONTHS, HAS LACK OF TRANSPORTATION KEPT YOU FROM MEDICAL APPOINTMENTS OR FROM GETTING MEDICATIONS?: PATIENT UNABLE TO ANSWER

## 2025-03-04 SDOH — ECONOMIC STABILITY: FOOD INSECURITY
WITHIN THE PAST 12 MONTHS, THE FOOD YOU BOUGHT JUST DIDN'T LAST AND YOU DIDN'T HAVE MONEY TO GET MORE.: PATIENT UNABLE TO ANSWER

## 2025-03-04 SDOH — ECONOMIC STABILITY: HOUSING INSECURITY
IN THE LAST 12 MONTHS, WAS THERE A TIME WHEN YOU WERE NOT ABLE TO PAY THE MORTGAGE OR RENT ON TIME?: PATIENT UNABLE TO ANSWER

## 2025-03-04 SDOH — SOCIAL STABILITY: SOCIAL INSECURITY
WITHIN THE LAST YEAR, HAVE YOU BEEN RAPED OR FORCED TO HAVE ANY KIND OF SEXUAL ACTIVITY BY YOUR PARTNER OR EX-PARTNER?: PATIENT UNABLE TO ANSWER

## 2025-03-04 SDOH — SOCIAL STABILITY: SOCIAL INSECURITY: ARE YOU OR HAVE YOU BEEN THREATENED OR ABUSED PHYSICALLY, EMOTIONALLY, OR SEXUALLY BY ANYONE?: UNABLE TO ASSESS

## 2025-03-04 SDOH — SOCIAL STABILITY: SOCIAL INSECURITY
WITHIN THE LAST YEAR, HAVE YOU BEEN KICKED, HIT, SLAPPED, OR OTHERWISE PHYSICALLY HURT BY YOUR PARTNER OR EX-PARTNER?: PATIENT UNABLE TO ANSWER

## 2025-03-04 SDOH — HEALTH STABILITY: MENTAL HEALTH: HOW OFTEN DO YOU HAVE A DRINK CONTAINING ALCOHOL?: NEVER

## 2025-03-04 SDOH — ECONOMIC STABILITY: HOUSING INSECURITY: AT ANY TIME IN THE PAST 12 MONTHS, WERE YOU HOMELESS OR LIVING IN A SHELTER (INCLUDING NOW)?: NO

## 2025-03-04 SDOH — ECONOMIC STABILITY: INCOME INSECURITY
IN THE PAST 12 MONTHS HAS THE ELECTRIC, GAS, OIL, OR WATER COMPANY THREATENED TO SHUT OFF SERVICES IN YOUR HOME?: PATIENT UNABLE TO ANSWER

## 2025-03-04 SDOH — SOCIAL STABILITY: SOCIAL INSECURITY: DOES ANYONE TRY TO KEEP YOU FROM HAVING/CONTACTING OTHER FRIENDS OR DOING THINGS OUTSIDE YOUR HOME?: UNABLE TO ASSESS

## 2025-03-04 SDOH — ECONOMIC STABILITY: FOOD INSECURITY
WITHIN THE PAST 12 MONTHS, YOU WORRIED THAT YOUR FOOD WOULD RUN OUT BEFORE YOU GOT THE MONEY TO BUY MORE.: PATIENT UNABLE TO ANSWER

## 2025-03-04 SDOH — ECONOMIC STABILITY: HOUSING INSECURITY: IN THE PAST 12 MONTHS, HOW MANY TIMES HAVE YOU MOVED WHERE YOU WERE LIVING?: 0

## 2025-03-04 SDOH — HEALTH STABILITY: MENTAL HEALTH: HOW MANY DRINKS CONTAINING ALCOHOL DO YOU HAVE ON A TYPICAL DAY WHEN YOU ARE DRINKING?: PATIENT DOES NOT DRINK

## 2025-03-04 ASSESSMENT — COGNITIVE AND FUNCTIONAL STATUS - GENERAL
PERSONAL GROOMING: A LOT
DRESSING REGULAR LOWER BODY CLOTHING: A LOT
WALKING IN HOSPITAL ROOM: A LOT
PERSONAL GROOMING: A LITTLE
DRESSING REGULAR UPPER BODY CLOTHING: A LOT
HELP NEEDED FOR BATHING: A LOT
TOILETING: A LOT
HELP NEEDED FOR BATHING: A LITTLE
DRESSING REGULAR LOWER BODY CLOTHING: A LOT
DAILY ACTIVITIY SCORE: 12
WALKING IN HOSPITAL ROOM: A LOT
HELP NEEDED FOR BATHING: A LITTLE
HELP NEEDED FOR BATHING: A LOT
STANDING UP FROM CHAIR USING ARMS: A LOT
TOILETING: TOTAL
TURNING FROM BACK TO SIDE WHILE IN FLAT BAD: A LOT
TOILETING: A LOT
MOBILITY SCORE: 12
DRESSING REGULAR LOWER BODY CLOTHING: TOTAL
DAILY ACTIVITIY SCORE: 16
EATING MEALS: A LITTLE
DRESSING REGULAR UPPER BODY CLOTHING: A LOT
CLIMB 3 TO 5 STEPS WITH RAILING: A LOT
EATING MEALS: A LITTLE
STANDING UP FROM CHAIR USING ARMS: A LOT
DRESSING REGULAR UPPER BODY CLOTHING: A LITTLE
CLIMB 3 TO 5 STEPS WITH RAILING: TOTAL
DRESSING REGULAR UPPER BODY CLOTHING: A LITTLE
DAILY ACTIVITIY SCORE: 16
MOVING TO AND FROM BED TO CHAIR: A LOT
EATING MEALS: A LITTLE
MOVING TO AND FROM BED TO CHAIR: A LOT
TURNING FROM BACK TO SIDE WHILE IN FLAT BAD: TOTAL
CLIMB 3 TO 5 STEPS WITH RAILING: TOTAL
MOVING FROM LYING ON BACK TO SITTING ON SIDE OF FLAT BED WITH BEDRAILS: A LITTLE
PATIENT BASELINE BEDBOUND: NO
MOVING TO AND FROM BED TO CHAIR: A LOT
WALKING IN HOSPITAL ROOM: A LOT
MOVING FROM LYING ON BACK TO SITTING ON SIDE OF FLAT BED WITH BEDRAILS: A LOT
STANDING UP FROM CHAIR USING ARMS: A LOT
CLIMB 3 TO 5 STEPS WITH RAILING: TOTAL
PERSONAL GROOMING: A LITTLE
TOILETING: A LOT
TURNING FROM BACK TO SIDE WHILE IN FLAT BAD: A LOT
MOVING TO AND FROM BED TO CHAIR: A LOT
DRESSING REGULAR LOWER BODY CLOTHING: A LOT
MOVING FROM LYING ON BACK TO SITTING ON SIDE OF FLAT BED WITH BEDRAILS: A LITTLE
PERSONAL GROOMING: A LITTLE
DAILY ACTIVITIY SCORE: 13
EATING MEALS: A LITTLE
MOBILITY SCORE: 10
TURNING FROM BACK TO SIDE WHILE IN FLAT BAD: A LOT
STANDING UP FROM CHAIR USING ARMS: A LITTLE
MOVING FROM LYING ON BACK TO SITTING ON SIDE OF FLAT BED WITH BEDRAILS: TOTAL
MOBILITY SCORE: 12
WALKING IN HOSPITAL ROOM: A LOT
MOBILITY SCORE: 12

## 2025-03-04 ASSESSMENT — ENCOUNTER SYMPTOMS
NAUSEA: 0
COUGH: 1
VOMITING: 0
WHEEZING: 0
FATIGUE: 0
CONSTIPATION: 0
CONFUSION: 0
NECK PAIN: 0
LIGHT-HEADEDNESS: 0
WEAKNESS: 1
DIAPHORESIS: 0
CHOKING: 0
WOUND: 0
JOINT SWELLING: 0
SORE THROAT: 0
NERVOUS/ANXIOUS: 0
FACIAL SWELLING: 0
ABDOMINAL PAIN: 0
BACK PAIN: 0
PALPITATIONS: 0
CHILLS: 0
ACTIVITY CHANGE: 1
DIARRHEA: 0
SHORTNESS OF BREATH: 1
CHEST TIGHTNESS: 0
DYSURIA: 0
FEVER: 0
AGITATION: 0
HALLUCINATIONS: 0
HEMATURIA: 0

## 2025-03-04 ASSESSMENT — LIFESTYLE VARIABLES
AUDIT-C TOTAL SCORE: 0
AUDIT-C TOTAL SCORE: 0
HOW OFTEN DO YOU HAVE A DRINK CONTAINING ALCOHOL: NEVER
AUDIT-C TOTAL SCORE: 0
SKIP TO QUESTIONS 9-10: 1
HOW MANY STANDARD DRINKS CONTAINING ALCOHOL DO YOU HAVE ON A TYPICAL DAY: PATIENT DOES NOT DRINK
HOW OFTEN DO YOU HAVE 6 OR MORE DRINKS ON ONE OCCASION: NEVER
SKIP TO QUESTIONS 9-10: 1

## 2025-03-04 ASSESSMENT — ACTIVITIES OF DAILY LIVING (ADL)
WALKS IN HOME: NEEDS ASSISTANCE
JUDGMENT_ADEQUATE_SAFELY_COMPLETE_DAILY_ACTIVITIES: YES
ADEQUATE_TO_COMPLETE_ADL: YES
LACK_OF_TRANSPORTATION: PATIENT UNABLE TO ANSWER
LACK_OF_TRANSPORTATION: NO
ADL_ASSISTANCE: INDEPENDENT
ASSISTIVE_DEVICE: WALKER
HEARING - RIGHT EAR: FUNCTIONAL
TOILETING: NEEDS ASSISTANCE
GROOMING: NEEDS ASSISTANCE
FEEDING YOURSELF: INDEPENDENT
ADL_ASSISTANCE: INDEPENDENT
DRESSING YOURSELF: NEEDS ASSISTANCE
HEARING - LEFT EAR: FUNCTIONAL
PATIENT'S MEMORY ADEQUATE TO SAFELY COMPLETE DAILY ACTIVITIES?: YES
BATHING_ASSISTANCE: MAXIMAL
BATHING: NEEDS ASSISTANCE

## 2025-03-04 ASSESSMENT — PATIENT HEALTH QUESTIONNAIRE - PHQ9
2. FEELING DOWN, DEPRESSED OR HOPELESS: NEARLY EVERY DAY
SUM OF ALL RESPONSES TO PHQ9 QUESTIONS 1 & 2: 6
1. LITTLE INTEREST OR PLEASURE IN DOING THINGS: NEARLY EVERY DAY

## 2025-03-04 ASSESSMENT — PAIN - FUNCTIONAL ASSESSMENT
PAIN_FUNCTIONAL_ASSESSMENT: 0-10

## 2025-03-04 ASSESSMENT — PAIN SCALES - GENERAL
PAINLEVEL_OUTOF10: 0 - NO PAIN

## 2025-03-04 NOTE — PROGRESS NOTES
03/04/25 1141   Discharge Planning   Living Arrangements Spouse/significant other;Children   Support Systems Spouse/significant other;Children   Assistance Needed minimal   Type of Residence Private residence   Home or Post Acute Services None   Expected Discharge Disposition Home   Housing Stability   At any time in the past 12 months, were you homeless or living in a shelter (including now)? N   Transportation Needs   In the past 12 months, has lack of transportation kept you from meetings, work, or from getting things needed for daily living? No   Stroke Family Assessment   Stroke Family Assessment Needed No   Intensity of Service   Intensity of Service 0-30 min     I spoke with patient to introduce discharge planning and explain role of TCC. Pts daughter with whom pt lives with provided most of assessment information. Pt lives with her daughter,  and her daughters .  Typically, pt is independent at home, does not use any mobility devices, denies falls.  Recently, since family has been battling the flu, pt has needed to be taken to bathroom via rollator d/t weakness, sob.  They have a shower chair but pt does not use it.  Pt has been using her daughters O2 at home.  Her daughter drives her and her PCP is Dr. Rebolledo.  Pt is currently on 4 l NC.  Unsure if will need it for home.  Daughter is expecting her to return home  and hoping will not need the oxygen.  PT eval pending.  Will follow for needs.      1604 PT rec for mod therapy ([facility] 10). I called and LVM with pts  to return call to discuss or let me know when he or his daughter will be here and we can talk about it here.  Await return call.

## 2025-03-04 NOTE — PROGRESS NOTES
Social work consult placed for positive medical risk screen. SW reviewed pt's chart and communicated with TCC. No SW needs foreseen at this time. SW signing off; available upon request.    MEHRAN Nazario, LSW ([id number])   Care Transitions   Vito Lucero  : 1950  [place]  [place] [address] [place] [address], [address] [facility]  [phone]   [phone]      OUTPATIENT PHYSICAL THERAPY: Daily Treatment Note 10/30/2019   Visit Count: 29      ICD-10: Treatment Diagnosis: Pain in joint, left knee M25.562  ICD-10: Treatment Diagnosis: pain in joint, right knee M25.561  ICD-10: Treatment Diagnosis: difficulty walking, not elsewhere classified R26.2  Precautions/Allergies:   Adhesive tape-silicones; Ceclor [cefaclor]; and Symbyax [olanzapine-fluoxetine]   Treatment Plan of Care Effective Dates: 8/15/19 TO 19    Pre-treatment Symptoms/Complaints:  Mrs. Bryn Garcia continues to be challenged with gait and ability to extend left knee, she continues to be challenged with weight bearing endurance and needs constant rest breaks. Pain: Initial: Pain Intensity 1: 2  Pain Location 1: Knee  Pain Orientation 1: Left  Post Session:  1/10   Medications Last Reviewed: 10/30/2019    Updated Objective Findings:  left knee AROM 1-130 degrees, poor activation of diaphragmatic breathing, demonstrates chest breathing. Challenged with left knee extension in weight bearing. Continues to be challenged with endurance   TREATMENT:     THERAPEUTIC EXERCISE: (40 minutes):  Exercises per grid below to improve mobility, strength, balance and coordination. Required moderate verbal and manual cues to promote proper body alignment, promote proper body posture, promote proper body mechanics and promote proper body breathing techniques. Progressed resistance, range, repetitions and complexity of movement as indicated. Date:  10/30/2019   Activity/Exercise Parameters   Supine heel slides X 10 reps, 5 sec holds   Supine short arc quad    Sit to stand transfers X 10 reps.     Supine straight leg raise X 10 reps, 10 sec holds   Seated hip hinge/weight acceptance X 5 reps BLE Nu-step    Seated knee extension    Standing terminal knee extension X 10 reps, BLE 5 sec holds, blue t-band   Standing lateral steps X 10 reps, 5' distance with green t-band   Therapeutic Neuroscience education    Standing hip extension X 30 reps BLE   Standing hip abduction X 30 reps BLE   Standing weight shift forward    Supine UE/LE abdominal brace    Standing functional squat X 30 reps,   Prone knee hang left    Prone knee extension/quad sets X 25 reps, 5 sec holds   Supine quad set    HEP review X 5 minute review   Step up X 30 taps BLE  X 30 step ups 6\" BLE   Standing marching on airex X 1 minute     Standing functional squats X 15 reps   Standing calf stretches X 5 reps, 25 sec holds BLE on board   Supine diaphragmatic breathing X  5 minutes education and x 10 reps,   Supine 90/90 hip flexion abdominal brace X 5 reps, 5 sec holds   Supine opposite UE/LE flexion and extension with abdominal bracing X 10 reps bilaterally    Standing hip flexion/abduction/extension X 15 reps, BLE   Gym machines X 30 reps, hip abduction, 20lbs  X 30 reps, bilateral knee extension, 20lbs  X 30 reps, bilateral knee flexion, 20 lbs. MANUAL THERAPY: (15 minutes): Joint mobilization and Soft tissue mobilization was utilized and necessary because of the patient's restricted joint motion and restricted motion of soft tissue. (Used abbreviations: MET - muscle energy technique; PNF - proprioceptive neuromuscular facilitation; NMR - neuromuscular re-education; a/p - anterior to posterior; p/a - posterior to anterior, FMP - functional movement patterns)  · Supine left knee soft tissue mobilization to anterior knee and patella mobilization in all directions. · Supine left hamstrings soft tissue mobilization with FMP of knee extension, repeated on right side  · Supine left anterior quadriceps and iliopsoas with FMP of knee flexion/extension, hip flexion.     [facility] Portal  Treatment/Session Summary:    · Response to Treatment: continues to be challenged with weight bearing exercises and endurance, challenged with LLE in stance phase and obtaining terminal knee extension  · Communication/Consultation:  None today  · Equipment provided today:  None today  · Recommendations/Intent for next treatment session: Next visit will focus on LE strength, endurance and balance in weight bearing, continue to address gait deficits. Total Treatment Billable Duration:  55 minutes.    PT Patient Time In/Time Out  Time In: 1530  Time Out: [facility], PT    Future Appointments   Date Time Provider Uzair Pineda   11/6/2019  3:30 PM [facility]   11/13/2019  4:30 PM Kely FRANCO, PT [facility]   11/14/2019  4:00 PM PP SLEEP MICHELE [facility]   11/20/2019  4:30 PM Justin Solomon., PT [facility]   11/26/2019  4:30 PM Justin Nicholson, PT [facility]   12/9/2019  2:00 PM Sylvia Hicks MD [facility]   1/14/2020  3:40 PM Sil Hood MD END [facility]   3/17/2020  [facility]:15 [facility]

## 2025-03-04 NOTE — ED TRIAGE NOTES
Pt presented to ED via EMS with c/o weakness and a falling. Per EMS family states that she has been using a family members walker to get around. Pt did fall, -LOC, -thinner per family.

## 2025-03-04 NOTE — PROGRESS NOTES
Occupational Therapy  Evaluation    Patient Name: Jenifer Means  MRN: [id number]  Today's Date: 3/4/2025  Time Calculation  Start Time: 1352  Stop Time: 1410  Time Calculation (min): 18 min    Current Problem:   1. Flu    2. Acute respiratory failure with hypoxia (Multi)    3. Pneumonia due to infectious organism, unspecified laterality, unspecified part of lung        OT order: OT eval and treat   Referred by: Con Elkins MD  Reason for referral: Influenza A, PNA. ADLs, safety assessment  Past medical history related to rehab: PMHx: hypertension and suspected dementia.    Precautions:   Hearing/Visual Limitations: [facility]  Medical Precautions: Fall precautions, Infection precautions, Oxygen therapy device and L/min    ASSESSMENT  OT Assessment: OT eval completed. The patient is functioning below baseline for ADLs and mobility. can benefit from continued OT. Pt with Decreased ADL status, Decreased upper extremity strength, Decreased safe judgment during ADL, Decreased cognition, Decreased endurance, Decreased functional mobility, Decreased IADLs, Decreased gross motor control  Prognosis:    Barriers to discharge home: Caregiver assistance, Cognition needs, Physical needs           Tolerance:      PLAN  Frequency: 3 times per week  Treatment Interventions: ADL retraining, Functional transfer training, UE strengthening/ROM, Endurance training, Cognitive reorientation, Patient/family training, Equipment evaluation/education, Neuromuscular reeducation  Discharge Recommendations: Moderate intensity level of continued care  OT OK to discharge: Yes    GENERAL VISIT INFORMATION   Start of session communication: Bedside nurse  End of session communication: Bedside nurse  Family/caregiver present: No  Caregiver feedback:    Co-Treatment: PT  Reason for co-treatment: to optimize safety and mobility, while focusing on discipline specific goals   Position Pt Received:  Up in chair, Alarm on  End of session position: Up in  chair, Alarm on    SUBJECTIVE  Home Living:  Type of Home: House  Lives With: Spouse, Adult children  Home Adaptive Equipment: None  Home Layout: One level  Home Access: Ramped entrance  Bathroom Shower/Tub: Tub/shower unit  Bathroom Equipment: Grab bars in shower, Shower chair with back     Prior Level of Function:  Receives Help From: Family  ADL Assistance: Independent  Homemaking Assistance: Independent  Ambulatory Assistance: Independent (no AD)  Prior Function Comments: Sleeps in recliner. Has an adjustable bed. (-) falls other than recent slide to the floor. Completes laundry and meal prep normally. Daughter provides transportation.    IADL History:       Pain:  Assessment: 0-10  Score: 0 - No pain  Type:    Location:    Interventions:    Response to pain interventions:      OBJECTIVE  Vital Signs:       Cognition:  Overall Cognitive Status: Impaired  Orientation Level: Disoriented to time, Disoriented to place, Disoriented to situation  Processing Speed: Delayed             Current ADL function:   EATING:  Minimal     GROOMING: Moderate     BATHING: Maximal     UB DRESSING: Maximal     LB DRESSING: Maximal Thread RLE into underwear, Thread LLE into underwear, Pull up over hips   TOILETING: Total Perineal hygiene, Clothing management down, Clothing management up  ADL comments:       Activity Tolerance:  Endurance: Decreased tolerance for upright activites  Activity Tolerance Comments: lethargic    Bed Mobility/Transfers:   Bed Mobility  Bed Mobility: No (pt out of bed and remained up)  Transfers  Transfer:  (sit<>stand from chair and BSC with min a x2)    Ambulation/Gait Training:  Functional Mobility  Functional Mobility Performed:  (pt performed functional mobility chair to BSC to chair with min A x1-2 FWW. max VC for sequencing)    Sitting Balance:  Static Sitting Balance  Static Sitting-Level of Assistance: Close supervision  Dynamic Sitting Balance  Dynamic Sitting-Level of Assistance: Contact  guard    Standing Balance:  Static Standing Balance  Static Standing-Level of Assistance: Contact guard, Minimum assistance  Dynamic Standing Balance  Dynamic Standing-Level of Assistance: Minimum assistance    Vision: Vision - Basic Assessment  Current Vision: No visual deficits   and      Sensation:  Light Touch: No apparent deficits    Strength:  Strength Comments: CASS grossly 4/5    Perception:  Inattention/Neglect: Appears intact    Coordination:  Movements are Fluid and Coordinated: Yes     Hand Function:  Hand Function  Gross Grasp: Functional    Extremities: RUE   RUE : Within Functional Limits and LUE   LUE: Within Functional Limits    Outcome Measures: [facility] Daily Activity   Putting on and taking off regular lower body clothing: Total  Bathing (including washing, rinsing, drying): A lot  Putting on and taking off regular upper body clothing: A lot  Toileting, which includes using toilet, bedpan or urinal: Total  Taking care of personal grooming such as brushing teeth: A little   Eating Meals: A little   Daily Activity - Total Score: 12    EDUCATION:     Education Documentation  ADL Training, taught by Marilou Shi OT at 3/4/2025  3:31 PM.  Learner: Patient  Readiness: Acceptance  Method: Explanation  Response: Needs Reinforcement    Education Comments  No comments found.        Goals:   Encounter Problems       Encounter Problems (Active)       ADLs       Patient with complete lower body dressing with modified independent level of assistance donning and doffing all LE clothes  with PRN adaptive equipment while supported sitting and standing (Progressing)       Start:  03/04/25    Expected End:  03/18/25            Patient will complete toileting including hygiene clothing management/hygiene with modified independent level of assistance and raised toilet seat and grab bars. (Progressing)       Start:  03/04/25    Expected End:  03/18/25               TRANSFERS       Patient will complete functional  transfers with least restrictive device with supervision level of assistance. (Progressing)       Start:  03/04/25    Expected End:  03/18/25

## 2025-03-04 NOTE — CARE PLAN
The patient's goals for the shift include        Problem: Pain - Adult  Goal: Verbalizes/displays adequate comfort level or baseline comfort level  Outcome: Progressing     Problem: Safety - Adult  Goal: Free from fall injury  Outcome: Progressing     Problem: Discharge Planning  Goal: Discharge to home or other facility with appropriate resources  Outcome: Progressing     Problem: Chronic Conditions and Co-morbidities  Goal: Patient's chronic conditions and co-morbidity symptoms are monitored and maintained or improved  Outcome: Progressing     Problem: Nutrition  Goal: Nutrient intake appropriate for maintaining nutritional needs  Outcome: Progressing     Problem: Fall/Injury  Goal: Not fall by end of shift  Outcome: Progressing  Goal: Be free from injury by end of the shift  Outcome: Progressing  Goal: Verbalize understanding of personal risk factors for fall in the hospital  Outcome: Progressing  Goal: Verbalize understanding of risk factor reduction measures to prevent injury from fall in the home  Outcome: Progressing  Goal: Use assistive devices by end of the shift  Outcome: Progressing  Goal: Pace activities to prevent fatigue by end of the shift  Outcome: Progressing     Problem: Respiratory  Goal: Clear secretions with interventions this shift  Outcome: Progressing  Goal: Minimize anxiety/maximize coping throughout shift  Outcome: Progressing  Goal: Minimal/no exertional discomfort or dyspnea this shift  Outcome: Progressing  Goal: No signs of respiratory distress (eg. Use of accessory muscles. Peds grunting)  Outcome: Progressing  Goal: Patent airway maintained this shift  Outcome: Progressing  Goal: Tolerate mechanical ventilation evidenced by VS/agitation level this shift  Outcome: Progressing  Goal: Tolerate pulmonary toileting this shift  Outcome: Progressing  Goal: Verbalize decreased shortness of breath this shift  Outcome: Progressing  Goal: Wean oxygen to maintain O2 saturation per order/standard  this shift  Outcome: Progressing  Goal: Increase self care and/or family involvement in next 24 hours  Outcome: Progressing     Problem: Skin  Goal: Decreased wound size/increased tissue granulation at next dressing change  Outcome: Progressing  Flowsheets (Taken 3/4/2025 0704)  Decreased wound size/increased tissue granulation at next dressing change:   Promote sleep for wound healing   Protective dressings over bony prominences  Goal: Participates in plan/prevention/treatment measures  Outcome: Progressing  Flowsheets (Taken 3/4/2025 0704)  Participates in plan/prevention/treatment measures:   Discuss with provider PT/OT consult   Elevate heels  Goal: Prevent/manage excess moisture  Outcome: Progressing  Flowsheets (Taken 3/4/2025 0704)  Prevent/manage excess moisture:   Follow provider orders for dressing changes   Monitor for/manage infection if present  Goal: Prevent/minimize sheer/friction injuries  Outcome: Progressing  Flowsheets (Taken 3/4/2025 0704)  Prevent/minimize sheer/friction injuries:   HOB 30 degrees or less   Increase activity/out of bed for meals   Use pull sheet  Goal: Promote/optimize nutrition  Outcome: Progressing  Flowsheets (Taken 3/4/2025 0704)  Promote/optimize nutrition:   Consume > 50% meals/supplements   Monitor/record intake including meals  Goal: Promote skin healing  Outcome: Progressing  Flowsheets (Taken 3/4/2025 0704)  Promote skin healing:   Assess skin/pad under line(s)/device(s)   Ensure correct size (line/device) and apply per  instructions   Protective dressings over bony prominences   Turn/reposition every 2 hours/use positioning/transfer devices   Rotate device position/do not position patient on device     Problem: Pain  Goal: Takes deep breaths with improved pain control throughout the shift  Outcome: Progressing  Goal: Turns in bed with improved pain control throughout the shift  Outcome: Progressing  Goal: Walks with improved pain control throughout the  shift  Outcome: Progressing  Goal: Performs ADL's with improved pain control throughout shift  Outcome: Progressing  Goal: Participates in PT with improved pain control throughout the shift  Outcome: Progressing  Goal: Free from opioid side effects throughout the shift  Outcome: Progressing  Goal: Free from acute confusion related to pain meds throughout the shift  Outcome: Progressing

## 2025-03-04 NOTE — ASSESSMENT & PLAN NOTE
Sepsis 2/2 Secondary Bacterial Pneumonia  -On admission, met SIRS criteria with evidence of pulmonary source  -Ceftriaxone 2 g daily  -Doxycycline 100 mg twice daily  3/4: Blood cultures are negative at day 1.  No urinary antigens were sent.  Suspect pneumococcal pneumonia.

## 2025-03-04 NOTE — ED PROVIDER NOTES
HPI   Chief Complaint   Patient presents with    Weakness, Gen    Fall     -thinner, -LOC       This is a 79-year-old female who has a past medical history of asthma/emphysema presents emerged department for general weakness and cough.  Granddaughter states that her mother has flu and she was her mother and everyone's been diagnosed with the flu.  They report that she has been coughing for the past 2 weeks and is generalized weakness.  They state that she went to get in bed and she slid down she not hit her head no other issues otherwise.  She is currently on oxygen while here.  She is not normally on oxygen at home.                          [place] Coma Scale Score: 15                  Patient History   History reviewed. No pertinent past medical history.  History reviewed. No pertinent surgical history.  No family history on file.  Social History     Tobacco Use    Smoking status: Never    Smokeless tobacco: Never   Substance Use Topics    Alcohol use: Never    Drug use: Never       Physical Exam   ED Triage Vitals [03/03/25 2041]   Temperature Heart Rate Respirations BP   36.9 °C (98.4 °F) 92 (!) 30 144/75      Pulse Ox Temp src Heart Rate Source Patient Position   (!) 93 % -- -- --      BP Location FiO2 (%)     -- --       Physical Exam  Constitutional:       General: She is not in acute distress.  HENT:      Head: Normocephalic and atraumatic.      Right Ear: Tympanic membrane normal.      Left Ear: Tympanic membrane normal.      Mouth/Throat:      Mouth: Mucous membranes are moist.   Eyes:      Extraocular Movements: Extraocular movements intact.      Conjunctiva/sclera: Conjunctivae normal.      Pupils: Pupils are equal, round, and reactive to light.   Cardiovascular:      Rate and Rhythm: Normal rate and regular rhythm.      Heart sounds: No murmur heard.  Pulmonary:      Effort: Pulmonary effort is normal. No respiratory distress.      Breath sounds: No stridor. Wheezing present. No rales.   Abdominal:       General: Bowel sounds are normal. There is no distension.      Tenderness: There is no abdominal tenderness. There is no guarding or rebound.   Musculoskeletal:         General: No swelling, tenderness or deformity. Normal range of motion.   Skin:     General: Skin is warm and dry.      Coloration: Skin is not jaundiced.      Findings: No bruising or lesion.   Neurological:      General: No focal deficit present.      Mental Status: She is alert and oriented to person, place, and time. Mental status is at baseline.      Cranial Nerves: No cranial nerve deficit.      Motor: No weakness.   Psychiatric:         Mood and Affect: Mood normal.       Labs Reviewed - No data to display  No orders to display       ED Course & MDM   ED Course as of 03/03/25 2316   Mon Mar 03, 2025   2159 EKG on my read shows sinus tachycardia at a rate of 103 bpm no ST changes or elevations, normal intervals. [CF]   2225 Flu A Result(!): Detected [CF]   2312 IMPRESSION:  Right infrahilar and bilateral basilar airspace disease, suggesting  aspiration or multilobar pneumonia in the appropriate clinical  setting.  Follow up chest radiograph is recommended 6-8 weeks after  appropriate treatment to document resolution..   [CF]      ED Course User Index  [CF] Skyla Melton MD       Medical Decision Making  This is a 79-year-old female who presents to the emergency department for shortness of breath cough and weakness.  She did fall but she slid out of bed according to family she did not hit anything did not hit her head has no signs of trauma.  Her lungs were slightly wheezy she was given a breathing treatment but this did not improve her oxygen requirement when she took off her oxygen she was at 84% she is usually not on any oxygen currently she is on 4 L.  Her lungs are now clear.  EKG is nonischemic and her troponins have remained flat at 23 and 21.  She was found to have the flu.  Her white blood cells are 13 given that patient chest  x-ray is showing signs of multilobular pneumonia as well sepsis was activated and precautions were obtained she was given broad-spectrum antibiotics.  Will admit patient for the flu and pneumonia and hypoxic respiratory failure.        Procedure  Procedures     Skyla Melton MD  03/03/25 3911

## 2025-03-04 NOTE — PROGRESS NOTES
Physical Therapy    Physical Therapy Evaluation    Patient Name: Jenifer Means  MRN: [id number]  Today's Date: 3/4/2025   Time Calculation  Start Time: 1019  Stop Time: 1040  Time Calculation (min): 21 min  3307/3307-A    Assessment/Plan   PT Assessment  PT Assessment Results: Decreased strength, Decreased endurance, Impaired balance, Decreased mobility, Decreased cognition, Decreased safety awareness  Rehab Prognosis: Good  Barriers to Discharge Home: Caregiver assistance, Physical needs  Caregiver Assistance: Caregiver assistance needed per identified barriers - however, level of patient's required assistance exceeds assistance available at home  Physical Needs: Ambulating household distances limited by function/safety, 24hr mobility assistance needed, Intermittent ADL assistance needed  Evaluation/Treatment Tolerance: Patient limited by fatigue  Medical Staff Made Aware: Yes  End of Session Communication: Bedside nurse  Assessment Comment: Patient presents with decreased endurance, balance, oral intake, SOB with mobility. Noted increased HR with minimal upright activity. Able to stand and take only 2 steps with WW and MIN A this date due to fatigue. She would benefit from continued PT with recommendation for MODERATE INTENSITY rehab.  End of Session Patient Position: Up in chair, Alarm on  IP OR SWING BED PT PLAN  Inpatient or Swing Bed: Inpatient     03/04/25 1019   PT Plan   Treatment/Interventions Transfer training;Gait training;Balance training;Strengthening;Endurance training;Therapeutic exercise;Therapeutic activity;Home exercise program   PT Plan Ongoing PT   PT Frequency 3 times per week   PT Discharge Recommendations Moderate intensity level of continued care   Equipment Recommended upon Discharge Wheeled walker  (TBD - pt has no AD at home.)   PT Recommended Transfer Status Assist x1   PT - OK to Discharge Yes  (To next level of care when medically cleared.)       Subjective     Current Problem:  1.  Flu        2. Acute respiratory failure with hypoxia (Multi)        3. Pneumonia due to infectious organism, unspecified laterality, unspecified part of lung          Patient Active Problem List   Diagnosis    Influenza A    Acute hypoxemic respiratory failure (Multi)    Bacterial pneumonia    Sepsis (Multi)    Troponin level elevated    Essential hypertension    Hyperlipidemia    Hypercholesterolemia    Low back pain    Diverticular disease of colon       General Visit Information:  General  Reason for Referral: Influenza A, PNA. Impaired mobility.  Referred By: Con Elkins MD  Past Medical History Relevant to Rehab: PMHx: hypertension and suspected dementia. (80 y/o female admitted with influenza and PNA, acute hypoxic respiratory failure.)  Family/Caregiver Present: Yes (Daughter)  Caregiver Feedback: Reports pt has been minimally active the past 4 days, unable to get up OOB, but slid to the floor - daughter unable to assist her up.  Prior to Session Communication: Bedside nurse  Patient Position Received: Up in chair, Alarm on  General Comment: Patient alert, pleasant, agreeable to participate in PT.    Home Living:  Home Living  Type of Home: House  Lives With: Spouse, Adult children (Daughter)  Home Adaptive Equipment: None  Home Layout: One level  Home Access: Ramped entrance  Bathroom Shower/Tub: Tub/shower unit  Bathroom Equipment: Grab bars in shower, Shower chair with back  Home Living Comments: W/c accessible home.    Prior Level of Function:  Prior Function Per Pt/Caregiver Report  Level of [place]: Independent with ADLs and functional transfers, Independent with homemaking with ambulation  Receives Help From: Family  ADL Assistance: Independent  Homemaking Assistance: Independent  Ambulatory Assistance: Independent (No AD)  Prior Function Comments: Sleeps in recliner. Has an adjustable bed. (-) falls other than recent slide to the floor. Completes laundry and meal prep normally. Daughter  "provides transportation.    Precautions:  Precautions  Precautions Comment: Fall precautions. 3.5LO2. Strict intake/output.    Vital Signs:  Vital Signs  Vitals Session: During PT  Heart Rate: (!) 114 (After standing.)  SpO2: 91 %  Objective     Pain:  Pain Assessment  Pain Assessment: 0-10  0-10 (Numeric) Pain Score: 0 - No pain    Cognition:  Cognition  Overall Cognitive Status: Impaired at baseline  Orientation Level: Disoriented to time, Disoriented to place, Disoriented to situation (Reports \"March '65\". States in [place], but unable to choose from options provided - then denied that she was in the hospital when re-oriented.)  Memory: Exceptions to WFL  Safety/Judgement: Exceptions to WFL  Insight: Moderate  Impulsive: Mildly    General Assessments:  General Observation  General Observation: Patient only ate 1 bite of fruit for breakfast per daughter. Pt encouraged to snack on foods and drink liquids to improve energy and gain strength. Pt agreeable to try some pudding. Also instructed pt in benefits of requesting assist (trained in use of call light) to use BSC. Pt agreeable to try.   Activity Tolerance  Endurance: Tolerates less than 10 min exercise with changes in vital signs, Decreased tolerance for upright activites  Activity Tolerance Comments: Very minimal activity tolerance.  Sensation  Light Touch: No apparent deficits  Strength  Strength Comments: B LE 4/5.  Perception  Inattention/Neglect: Appears intact  Coordination  Movements are Fluid and Coordinated: Yes     Static Sitting Balance  Static Sitting-Balance Support: Feet supported, Bilateral upper extremity supported  Static Sitting-Level of Assistance: Close supervision  Static Standing Balance  Static Standing-Balance Support: Bilateral upper extremity supported  Static Standing-Level of Assistance: Minimum assistance    Functional Assessments:     Bed Mobility  Bed Mobility:  (Patient in a recliner; sleeps in a recliner at " home.)  Transfers  Transfer:  (Sit <> stand MIN A.)  Ambulation/Gait Training  Ambulation/Gait Training Performed:  (Able to take 2 steps forward and back with WW and MIN A with significant encouragement.)  Stairs  Stairs: No (Not needed in current home set up.)       Extremity/Trunk Assessments:        RLE   RLE : Within Functional Limits  LLE   LLE : Within Functional Limits    Outcome Measures:     [facility] Basic Mobility  Turning from your back to your side while in a flat bed without using bedrails: Total  Moving from lying on your back to sitting on the side of a flat bed without using bedrails: Total  Moving to and from bed to chair (including a wheelchair): A lot  Standing up from a chair using your arms (e.g. wheelchair or bedside chair): A little  To walk in hospital room: A lot  Climbing 3-5 steps with railing: Total  Basic Mobility - Total Score: 10                                                             Goals:  Encounter Problems       Encounter Problems (Active)       To improve strength, endurance, and balance:        Patient will participate in B LE exercise x 15 repetitions with PSO2 and HR WFL.        Start:  03/04/25    Expected End:  03/18/25            Patient will complete transfers with CGA.        Start:  03/04/25    Expected End:  03/18/25            Patient will ambulate 30 feet x 2 with WW and CGA with PSO2 and HR WFL.        Start:  03/04/25    Expected End:  03/18/25                 Education Documentation  Mobility Training, taught by Becki Cueto PT at 3/4/2025 12:05 PM.  Learner: Family, Patient  Readiness: Acceptance  Method: Explanation  Response: Verbalizes Understanding, Needs Reinforcement    Education Comments  No comments found.

## 2025-03-04 NOTE — ASSESSMENT & PLAN NOTE
Sepsis 2/2 Secondary Bacterial Pneumonia  -On admission, met SIRS criteria with evidence of pulmonary source  -Ceftriaxone 2 g daily  -Doxycycline 100 mg twice daily

## 2025-03-04 NOTE — CARE PLAN
Problem: Pain - Adult  Goal: Verbalizes/displays adequate comfort level or baseline comfort level  3/4/2025 0235 by Khloe Gong RN  Outcome: Progressing  Flowsheets (Taken 3/4/2025 0235)  Verbalizes/displays adequate comfort level or baseline comfort level:   Encourage patient to monitor pain and request assistance   Assess pain using appropriate pain scale   Administer analgesics based on type and severity of pain and evaluate response   Implement non-pharmacological measures as appropriate and evaluate response  3/4/2025 0234 by Khloe Gong RN  Outcome: Progressing  Flowsheets (Taken 3/4/2025 0234)  Verbalizes/displays adequate comfort level or baseline comfort level:   Encourage patient to monitor pain and request assistance   Assess pain using appropriate pain scale   Administer analgesics based on type and severity of pain and evaluate response   Implement non-pharmacological measures as appropriate and evaluate response     Problem: Safety - Adult  Goal: Free from fall injury  3/4/2025 0235 by Khloe Gong RN  Outcome: Progressing  3/4/2025 0234 by Khloe Gong RN  Outcome: Progressing     Problem: Discharge Planning  Goal: Discharge to home or other facility with appropriate resources  3/4/2025 0235 by Khloe Gong RN  Outcome: Progressing  3/4/2025 0234 by Khloe Gong RN  Outcome: Progressing     Problem: Chronic Conditions and Co-morbidities  Goal: Patient's chronic conditions and co-morbidity symptoms are monitored and maintained or improved  3/4/2025 0235 by Khloe Gong RN  Outcome: Progressing  3/4/2025 0234 by Khloe Gong RN  Outcome: Progressing     Problem: Nutrition  Goal: Nutrient intake appropriate for maintaining nutritional needs  3/4/2025 0235 by Khloe Gong RN  Outcome: Progressing  3/4/2025 0234 by Khloe Gong RN  Outcome: Progressing     Problem: Fall/Injury  Goal: Not fall by end of shift  3/4/2025 0235 by Khloe MADISON  ANNA Gong  Outcome: Progressing  3/4/2025 0234 by Khloe Gong RN  Outcome: Progressing  Goal: Be free from injury by end of the shift  3/4/2025 0235 by Khloe Gong RN  Outcome: Progressing  3/4/2025 0234 by Khloe Gong RN  Outcome: Progressing  Goal: Verbalize understanding of personal risk factors for fall in the hospital  3/4/2025 0235 by Khloe Gong RN  Outcome: Progressing  3/4/2025 0234 by Khloe Gong RN  Outcome: Progressing  Goal: Verbalize understanding of risk factor reduction measures to prevent injury from fall in the home  3/4/2025 0235 by Khloe Gong RN  Outcome: Progressing  3/4/2025 0234 by Khloe Gong RN  Outcome: Progressing  Goal: Use assistive devices by end of the shift  3/4/2025 0235 by Khloe Gong RN  Outcome: Progressing  3/4/2025 0234 by Khloe Gong RN  Outcome: Progressing  Goal: Pace activities to prevent fatigue by end of the shift  3/4/2025 0235 by Khloe Gong RN  Outcome: Progressing  3/4/2025 0234 by Khloe Gong RN  Outcome: Progressing     Problem: Respiratory  Goal: Clear secretions with interventions this shift  3/4/2025 0235 by Khloe Gong RN  Outcome: Progressing  3/4/2025 0234 by Khloe Gong RN  Outcome: Progressing  Goal: Minimize anxiety/maximize coping throughout shift  3/4/2025 0235 by Khloe Gong RN  Outcome: Progressing  3/4/2025 0234 by Khloe Gong RN  Outcome: Progressing  Goal: Minimal/no exertional discomfort or dyspnea this shift  3/4/2025 0235 by Khloe Gong RN  Outcome: Progressing  3/4/2025 0234 by Khloe Gong RN  Outcome: Progressing  Goal: No signs of respiratory distress (eg. Use of accessory muscles. Peds grunting)  3/4/2025 0235 by Merinda D Beltran, RN  Outcome: Progressing  3/4/2025 0234 by Khloe Gong RN  Outcome: Progressing  Goal: Patent airway maintained this shift  3/4/2025 0235 by Khloe Gong RN  Outcome:  Progressing  3/4/2025 0234 by Khloe Gong RN  Outcome: Progressing  Goal: Tolerate mechanical ventilation evidenced by VS/agitation level this shift  3/4/2025 0235 by Khloe Gong RN  Outcome: Progressing  3/4/2025 0234 by Khloe Gong RN  Outcome: Progressing  Goal: Tolerate pulmonary toileting this shift  3/4/2025 0235 by Khloe Gong RN  Outcome: Progressing  3/4/2025 0234 by Khloe Gong RN  Outcome: Progressing  Goal: Verbalize decreased shortness of breath this shift  3/4/2025 0235 by Khloe Gong RN  Outcome: Progressing  3/4/2025 0234 by Khloe Gong RN  Outcome: Progressing  Goal: Wean oxygen to maintain O2 saturation per order/standard this shift  3/4/2025 0235 by Khloe Gong RN  Outcome: Progressing  3/4/2025 0234 by Khloe Gong RN  Outcome: Progressing  Goal: Increase self care and/or family involvement in next 24 hours  3/4/2025 0235 by Khloe Gong RN  Outcome: Progressing  3/4/2025 0234 by Khloe Gong RN  Outcome: Progressing   The patient's goals for the shift include      The clinical goals for the shift include

## 2025-03-04 NOTE — PROGRESS NOTES
Jenifer Means is a 79 y.o. female on day 0 of admission presenting with Influenza A.    Review of Systems   Constitutional:  Positive for activity change. Negative for chills, diaphoresis, fatigue and fever.   HENT:  Negative for congestion, facial swelling, sneezing and sore throat.    Respiratory:  Positive for cough and shortness of breath. Negative for choking, chest tightness and wheezing.    Cardiovascular:  Negative for chest pain, palpitations and leg swelling.   Gastrointestinal:  Negative for abdominal pain, constipation, diarrhea, nausea and vomiting.   Genitourinary:  Negative for dysuria, hematuria and urgency.   Musculoskeletal:  Negative for back pain, gait problem, joint swelling and neck pain.   Skin:  Negative for rash and wound.   Neurological:  Positive for weakness. Negative for syncope and light-headedness.   Psychiatric/Behavioral:  Negative for agitation, confusion and hallucinations. The patient is not nervous/anxious.    All other systems reviewed and are negative.     Subjective   Jenifer Means is a 79 y.o.-year-old with hx of hypertension and suspected dementia presents with generalized weakness and cough.  Patient was in her normal state of health until about 2 weeks ago when her daughter started noticing that she was weaker than usual and has been coughing.  This is gotten progressively worse since then.  Today patient went to get out of bed and slid down onto the floor.  Did not actually fall.  Did not hit her head.  No LOC event.  Patient currently denies shortness of breath, fevers, or chills.  No lower extremity edema.  No sick contacts at home.  In the ED, needing 4 L O2 to keep sats greater than 90%.  Pulse 112.  WBC 13.0.  Found to be positive for influenza A.  CXR with evidence of right-sided infiltrate.  Started on antibiotics and Tamiflu and admitted to medicine.     3/4: Patient seen.  Remains on supplemental O2.  Feels fatigued, complains of myalgias.  Continue  treatment for influenza A.  No urinary symptoms.  Discussed with family in room.  Will need another 24 to 48 hours of treatment.  Reassess in AM.       Objective     Last Recorded Vitals  /68 (BP Location: Right arm, Patient Position: Lying)   Pulse 100   Temp 36.8 °C (98.2 °F) (Temporal)   Resp 22   Wt 79.5 kg (175 lb 4.3 oz)   SpO2 96%   Intake/Output last 3 Shifts:    Intake/Output Summary (Last 24 hours) at 3/4/2025 0917  Last data filed at 3/4/2025 0840  Gross per 24 hour   Intake 780 ml   Output 200 ml   Net 580 ml       Admission Weight  Weight: 78.9 kg (174 lb) (03/03/25 2041)    Daily Weight  03/04/25 : 79.5 kg (175 lb 4.3 oz)      Physical Exam  Constitutional:       General: She is not in acute distress.  HENT:      Head: Normocephalic and atraumatic.      Nose: Nose normal. No congestion or rhinorrhea.      Mouth/Throat:      Mouth: Mucous membranes are dry.      Pharynx: Oropharynx is clear.   Eyes:      General: No scleral icterus.     Extraocular Movements: Extraocular movements intact.      Pupils: Pupils are equal, round, and reactive to light.   Cardiovascular:      Rate and Rhythm: Normal rate and regular rhythm.      Heart sounds: Normal heart sounds. No murmur heard.     No friction rub. No gallop.   Pulmonary:      Effort: Pulmonary effort is normal.      Breath sounds: Normal breath sounds. No wheezing, rhonchi or rales.   Chest:      Chest wall: No tenderness.   Abdominal:      General: There is no distension.      Palpations: Abdomen is soft.      Tenderness: There is no abdominal tenderness. There is no guarding or rebound.   Musculoskeletal:         General: No swelling, tenderness or signs of injury. Normal range of motion.      Cervical back: Normal range of motion.   Skin:     General: Skin is warm and dry.      Coloration: Skin is not jaundiced.      Findings: No bruising, erythema or rash.   Neurological:      General: No focal deficit present.      Mental Status: She is  alert and oriented to person, place, and time.          Lab Results  Results for orders placed or performed during the hospital encounter of 03/03/25 (from the past 24 hours)   ECG 12 lead   Result Value Ref Range    Ventricular Rate 103 BPM    Atrial Rate 103 BPM    NC Interval 180 ms    QRS Duration 75 ms    QT Interval 343 ms    QTC Calculation(Bazett) 449 ms    P Axis 51 degrees    R Axis 43 degrees    T Axis 42 degrees    QRS Count 17 beats    Q Onset 249 ms    T Offset 421 ms    QTC Fredericia 411 ms   Sars-CoV-2 PCR   Result Value Ref Range    Coronavirus 2019, PCR Not Detected Not Detected   Influenza A, and B PCR   Result Value Ref Range    Flu A Result Detected (A) Not Detected    Flu B Result Not Detected Not Detected   CBC and Auto Differential   Result Value Ref Range    WBC 13.0 (H) 4.4 - 11.3 x10*3/uL    nRBC 0.0 0.0 - 0.0 /100 WBCs    RBC 4.58 4.00 - 5.20 x10*6/uL    Hemoglobin 12.1 12.0 - 16.0 g/dL    Hematocrit 37.2 36.0 - 46.0 %    MCV 81 80 - 100 fL    MCH 26.4 26.0 - 34.0 pg    MCHC 32.5 32.0 - 36.0 g/dL    RDW 13.8 11.5 - 14.5 %    Platelets 379 150 - 450 x10*3/uL    Neutrophils % 87.0 40.0 - 80.0 %    Immature Granulocytes %, Automated 3.1 (H) 0.0 - 0.9 %    Lymphocytes % 3.2 13.0 - 44.0 %    Monocytes % 5.8 2.0 - 10.0 %    Eosinophils % 0.0 0.0 - 6.0 %    Basophils % 0.9 0.0 - 2.0 %    Neutrophils Absolute 11.28 (H) 1.60 - 5.50 x10*3/uL    Immature Granulocytes Absolute, Automated 0.40 0.00 - 0.50 x10*3/uL    Lymphocytes Absolute 0.42 (L) 0.80 - 3.00 x10*3/uL    Monocytes Absolute 0.75 0.05 - 0.80 x10*3/uL    Eosinophils Absolute 0.00 0.00 - 0.40 x10*3/uL    Basophils Absolute 0.12 (H) 0.00 - 0.10 x10*3/uL   Comprehensive metabolic panel   Result Value Ref Range    Glucose 119 (H) 74 - 99 mg/dL    Sodium 132 (L) 136 - 145 mmol/L    Potassium 3.6 3.5 - 5.3 mmol/L    Chloride 91 (L) 98 - 107 mmol/L    Bicarbonate 33 (H) 21 - 32 mmol/L    Anion Gap 12 10 - 20 mmol/L    Urea Nitrogen 18 6 - 23  mg/dL    Creatinine 0.69 0.50 - 1.05 mg/dL    eGFR 90 >60 mL/min/1.73m*2    Calcium 9.1 8.6 - 10.3 mg/dL    Albumin 3.4 3.4 - 5.0 g/dL    Alkaline Phosphatase 87 33 - 136 U/L    Total Protein 7.2 6.4 - 8.2 g/dL    AST 24 9 - 39 U/L    Bilirubin, Total 0.9 0.0 - 1.2 mg/dL    ALT 23 7 - 45 U/L   Magnesium   Result Value Ref Range    Magnesium 1.97 1.60 - 2.40 mg/dL   Lipase   Result Value Ref Range    Lipase 10 9 - 82 U/L   B-Type Natriuretic Peptide   Result Value Ref Range     (H) 0 - 99 pg/mL   Blood Gas Venous Full Panel   Result Value Ref Range    POCT pH, Venous 7.44 (H) 7.33 - 7.43 pH    POCT pCO2, Venous 52 (H) 41 - 51 mm Hg    POCT pO2, Venous 25 (L) 35 - 45 mm Hg    POCT SO2, Venous 37 (L) 45 - 75 %    POCT Oxy Hemoglobin, Venous 35.8 (L) 45.0 - 75.0 %    POCT Hematocrit Calculated, Venous 38.0 36.0 - 46.0 %    POCT Sodium, Venous 129 (L) 136 - 145 mmol/L    POCT Potassium, Venous 3.6 3.5 - 5.3 mmol/L    POCT Chloride, Venous 91 (L) 98 - 107 mmol/L    POCT Ionized Calicum, Venous 1.16 1.10 - 1.33 mmol/L    POCT Glucose, Venous 126 (H) 74 - 99 mg/dL    POCT Lactate, Venous 1.8 0.4 - 2.0 mmol/L    POCT Base Excess, Venous 9.5 (H) -2.0 - 3.0 mmol/L    POCT HCO3 Calculated, Venous 35.3 (H) 22.0 - 26.0 mmol/L    POCT Hemoglobin, Venous 12.5 12.0 - 16.0 g/dL    POCT Anion Gap, Venous 6.0 (L) 10.0 - 25.0 mmol/L    Patient Temperature 37.0 degrees Celsius    FiO2 36 %   Troponin I, High Sensitivity, Initial   Result Value Ref Range    Troponin I, High Sensitivity 23 (H) 0 - 13 ng/L   Troponin, High Sensitivity, 1 Hour   Result Value Ref Range    Troponin I, High Sensitivity 21 (H) 0 - 13 ng/L   Lactate   Result Value Ref Range    Lactate 1.1 0.4 - 2.0 mmol/L   Blood Culture    Specimen: Peripheral Venipuncture; Blood culture   Result Value Ref Range    Blood Culture Loaded on Instrument - Culture in progress    Blood Culture    Specimen: Peripheral Venipuncture; Blood culture   Result Value Ref Range    Blood  Culture Loaded on Instrument - Culture in progress    MRSA Surveillance for Vancomycin De-escalation, PCR    Specimen: Anterior Nares; Swab   Result Value Ref Range    MRSA PCR Not Detected Not Detected   CBC   Result Value Ref Range    WBC 14.2 (H) 4.4 - 11.3 x10*3/uL    nRBC 0.0 0.0 - 0.0 /100 WBCs    RBC 4.42 4.00 - 5.20 x10*6/uL    Hemoglobin 11.6 (L) 12.0 - 16.0 g/dL    Hematocrit 35.7 (L) 36.0 - 46.0 %    MCV 81 80 - 100 fL    MCH 26.2 26.0 - 34.0 pg    MCHC 32.5 32.0 - 36.0 g/dL    RDW 13.9 11.5 - 14.5 %    Platelets 383 150 - 450 x10*3/uL   Basic metabolic panel   Result Value Ref Range    Glucose 101 (H) 74 - 99 mg/dL    Sodium 131 (L) 136 - 145 mmol/L    Potassium 3.5 3.5 - 5.3 mmol/L    Chloride 91 (L) 98 - 107 mmol/L    Bicarbonate 30 21 - 32 mmol/L    Anion Gap 14 10 - 20 mmol/L    Urea Nitrogen 16 6 - 23 mg/dL    Creatinine 0.61 0.50 - 1.05 mg/dL    eGFR >90 >60 mL/min/1.73m*2    Calcium 8.9 8.6 - 10.3 mg/dL   Urinalysis with Reflex Culture and Microscopic   Result Value Ref Range    Color, Urine Yellow Light-Yellow, Yellow, Dark-Yellow    Appearance, Urine Turbid (N) Clear    Specific Gravity, Urine 1.036 (N) 1.005 - 1.035    pH, Urine 6.0 5.0, 5.5, 6.0, 6.5, 7.0, 7.5, 8.0    Protein, Urine 100 (2+) (A) NEGATIVE, 10 (TRACE), 20 (TRACE) mg/dL    Glucose, Urine 30 (TRACE) (A) Normal mg/dL    Blood, Urine 0.06 (1+) (A) NEGATIVE mg/dL    Ketones, Urine 20 (1+) (A) NEGATIVE mg/dL    Bilirubin, Urine NEGATIVE NEGATIVE mg/dL    Urobilinogen, Urine Normal Normal mg/dL    Nitrite, Urine NEGATIVE NEGATIVE    Leukocyte Esterase, Urine 75 Dax/uL (A) NEGATIVE   Microscopic Only, Urine   Result Value Ref Range    WBC, Urine 21-50 (A) 1-5, NONE /HPF    RBC, Urine 11-20 (A) NONE, 1-2, 3-5 /HPF    Squamous Epithelial Cells, Urine 1-9 (SPARSE) Reference range not established. /HPF    Bacteria, Urine 1+ (A) NONE SEEN /HPF    Mucus, Urine 2+ Reference range not established. /LPF    Hyaline Casts, Urine 1+ (A) NONE /LPF         Image Results  ECG 12 lead  Sinus tachycardia  Left atrial enlargement  Probable anteroseptal infarct, old       Assessment/Plan     Assessment & Plan  Influenza A  Tamiflu 75 mg twice daily  Bacterial pneumonia  Sepsis 2/2 Secondary Bacterial Pneumonia  -On admission, met SIRS criteria with evidence of pulmonary source  -Ceftriaxone 2 g daily  -Doxycycline 100 mg twice daily  3/4: Blood cultures are negative at day 1.  No urinary antigens were sent.  Suspect pneumococcal pneumonia.  Acute hypoxemic respiratory failure (Multi)  -On adm, needing 2 L O2 to keep sats greater than 90%  -Suspect secondary to influenza and bacterial pneumonia below, wean O2 as able  3/4: Wean O2 as able.  Sepsis (Multi)  Sepsis 2/2 Secondary Bacterial Pneumonia  -On admission, met SIRS criteria with evidence of pulmonary source  -Ceftriaxone 2 g daily  -Doxycycline 100 mg twice daily  Troponin level elevated  Type II MI: Suspect demand in setting of above, treatment per above  Essential hypertension  Home meds  Hyperlipidemia  Home meds  Hypercholesterolemia  Home meds           Jones Espinosa MD

## 2025-03-04 NOTE — ED NOTES
Pt arrives to ED via EMS from home    Code Status:  No Order    HPI     This is a 79-year-old female who has a past medical history of asthma/emphysema presents emerged department for general weakness and cough.  Granddaughter states that her mother has flu and she was her mother and everyone's been diagnosed with the flu.  They report that she has been coughing for the past 2 weeks and is generalized weakness.  They state that she went to get in bed and she slid down she not hit her head no other issues otherwise.  She is currently on oxygen while here.  She is not normally on oxygen at home.       Chief Complaint   Patient presents with    Weakness, Gen    Fall     -thinner, -LOC       /75   Pulse (!) 112   Temp 36.9 °C (98.4 °F)   Resp 17   Wt 78.9 kg (174 lb)   SpO2 96%     [place] Coma Scale Score: 15      LDA:   Peripheral IV 03/03/25 20 G Left;Proximal Forearm (Active)   Placement Date/Time: 03/03/25 2130   Size (Gauge): 20 G  Orientation: Left;Proximal  Location: Forearm  Site Prep: Chlorhexidine    Number of days: 0        BACKGROUND  History reviewed. No pertinent past medical history.  History reviewed. No pertinent surgical history.  No current facility-administered medications on file prior to encounter.     Current Outpatient Medications on File Prior to Encounter   Medication Sig Dispense Refill    albuterol 90 mcg/actuation inhaler Inhale 2 puffs every 4 hours if needed for wheezing. 6.7 g 0    aspirin 81 mg chewable tablet Chew 1 tablet (81 mg) once daily.      hydroCHLOROthiazide (Microzide) 12.5 mg capsule Take 1 capsule (12.5 mg) by mouth once every 24 hours.      lidocaine (Lidoderm) 5 % patch Place 1 patch on the skin in affected area and leave on for 12 hours. Remove for 12 hours 10 patch 0    lisinopril 40 mg tablet Take 1 tablet (40 mg) by mouth once daily.      methocarbamol (Robaxin) 500 mg tablet Take 1 tablet (500 mg) by mouth 2 times a day for 10 days. 20 tablet 0     pantoprazole (ProtoNix) 40 mg EC tablet Take 1 tablet (40 mg) by mouth once daily.      simvastatin (Zocor) 20 mg tablet Take 1 tablet (20 mg) by mouth once daily.          ASSESSMENT  ED Course as of 03/03/25 2349   Mon Mar 03, 2025   2159 EKG on my read shows sinus tachycardia at a rate of 103 bpm no ST changes or elevations, normal intervals. [CF]   2225 Flu A Result(!): Detected [CF]   2312 IMPRESSION:  Right infrahilar and bilateral basilar airspace disease, suggesting  aspiration or multilobar pneumonia in the appropriate clinical  setting.  Follow up chest radiograph is recommended 6-8 weeks after  appropriate treatment to document resolution..   [CF]      ED Course User Index  [CF] Skyla Melton MD         Diagnoses as of 03/03/25 2349   Flu   Acute respiratory failure with hypoxia (Multi)   Pneumonia due to infectious organism, unspecified laterality, unspecified part of lung       Medications Currently Running:        Medications Given:  ED Medication Administration from 03/03/2025 2041 to 03/03/2025 2349         Date/Time Order Dose Route Action Action by     03/03/2025 2134 EST ipratropium-albuteroL (Duo-Neb) 0.5-2.5 mg/3 mL nebulizer solution 3 mL 3 mL nebulization Given KYRA Shafer     03/03/2025 2342 EST piperacillin-tazobactam (Zosyn) 4.5 g in dextrose (iso)  mL 4.5 g intravenous New Bag KYRA Shafer                 RESULTS    Imaging:  XR chest 2 views   Final Result   Right infrahilar and bilateral basilar airspace disease, suggesting   aspiration or multilobar pneumonia in the appropriate clinical   setting.  Follow up chest radiograph is recommended 6-8 weeks after   appropriate treatment to document resolution..   Signed by Jass Romano         }  Labs ::99  Abnormal Labs Reviewed   CBC WITH AUTO DIFFERENTIAL - Abnormal; Notable for the following components:       Result Value    WBC 13.0 (*)     Immature Granulocytes %, Automated 3.1 (*)     Neutrophils Absolute 11.28 (*)     Lymphocytes  Absolute 0.42 (*)     Basophils Absolute 0.12 (*)     All other components within normal limits   COMPREHENSIVE METABOLIC PANEL - Abnormal; Notable for the following components:    Glucose 119 (*)     Sodium 132 (*)     Chloride 91 (*)     Bicarbonate 33 (*)     All other components within normal limits   B-TYPE NATRIURETIC PEPTIDE - Abnormal; Notable for the following components:     (*)     All other components within normal limits    Narrative:        <100 pg/mL - Heart failure unlikely                  100-299 pg/mL - Intermediate probability of acute heart                                  failure exacerbation. Correlate with clinical                                  context and patient history.                    >=300 pg/mL - Heart Failure likely. Correlate with clinical                                  context and patient history.                                    BNP testing is performed using different testing methodology at [facility] than at other [facility]. Direct result comparisons should only be made within the same method.                      BLOOD GAS VENOUS FULL PANEL - Abnormal; Notable for the following components:    POCT pH, Venous 7.44 (*)     POCT pCO2, Venous 52 (*)     POCT pO2, Venous 25 (*)     POCT SO2, Venous 37 (*)     POCT Oxy Hemoglobin, Venous 35.8 (*)     POCT Sodium, Venous 129 (*)     POCT Chloride, Venous 91 (*)     POCT Glucose, Venous 126 (*)     POCT Base Excess, Venous 9.5 (*)     POCT HCO3 Calculated, Venous 35.3 (*)     POCT Anion Gap, Venous 6.0 (*)     All other components within normal limits   INFLUENZA A AND B PCR - Abnormal; Notable for the following components:    Flu A Result Detected (*)     All other components within normal limits    Narrative:     This assay is an in vitro diagnostic multiplex nucleic acid amplification test for the detection and discrimination of Influenza A & B from nasopharyngeal specimens, and has been validated  for use at [facility]. Negative results do not preclude Influenza A/B infections, and should not be used as the sole basis for diagnosis, treatment, or other management decisions. If Influenza A/B and RSV PCR results are negative, testing for Parainfluenza virus, Adenovirus and Metapneumovirus is routinely performed for [facility] pediatric oncology and intensive care inpatients, and is available on other patients by placing an add-on request.   SERIAL TROPONIN-INITIAL - Abnormal; Notable for the following components:    Troponin I, High Sensitivity 23 (*)     All other components within normal limits    Narrative:     Less than 99th percentile of normal range cutoff-                  Female and children under 18 years old <14 ng/L; Male <21 ng/L: Negative                  Repeat testing should be performed if clinically indicated.                                     Female and children under 18 years old 14-50 ng/L; Male 21-50 ng/L:                  Consistent with possible cardiac damage and possible increased clinical                   risk. Serial measurements may help to assess extent of myocardial damage.                                     >50 ng/L: Consistent with cardiac damage, increased clinical risk and                  myocardial infarction. Serial measurements may help assess extent of                   myocardial damage.                                      NOTE: Children less than 1 year old may have higher baseline troponin                   levels and results should be interpreted in conjunction with the overall                   clinical context.                                     NOTE: Troponin I testing is performed using a different                   testing methodology at [facility] than at other                   [facility]. Direct result comparisons should only                   be made within the same method.   SERIAL TROPONIN, 1 HOUR - Abnormal;  Notable for the following components:    Troponin I, High Sensitivity 21 (*)     All other components within normal limits    Narrative:     Less than 99th percentile of normal range cutoff-                  Female and children under 18 years old <14 ng/L; Male <21 ng/L: Negative                  Repeat testing should be performed if clinically indicated.                                     Female and children under 18 years old 14-50 ng/L; Male 21-50 ng/L:                  Consistent with possible cardiac damage and possible increased clinical                   risk. Serial measurements may help to assess extent of myocardial damage.                                     >50 ng/L: Consistent with cardiac damage, increased clinical risk and                  myocardial infarction. Serial measurements may help assess extent of                   myocardial damage.                                      NOTE: Children less than 1 year old may have higher baseline troponin                   levels and results should be interpreted in conjunction with the overall                   clinical context.                                     NOTE: Troponin I testing is performed using a different                   testing methodology at [facility] than at other                   [facility]. Direct result comparisons should only                   be made within the same method.                Elizabet Brutto, RN  03/03/25 3127

## 2025-03-04 NOTE — ASSESSMENT & PLAN NOTE
-On adm, needing 2 L O2 to keep sats greater than 90%  -Suspect secondary to influenza and bacterial pneumonia below, wean O2 as able  3/4: Wean O2 as able.

## 2025-03-04 NOTE — CARE PLAN
Problem: Pain - Adult  Goal: Verbalizes/displays adequate comfort level or baseline comfort level  3/4/2025 0351 by Khloe Gong RN  Outcome: Progressing  Flowsheets (Taken 3/4/2025 0351)  Verbalizes/displays adequate comfort level or baseline comfort level:   Encourage patient to monitor pain and request assistance   Assess pain using appropriate pain scale   Administer analgesics based on type and severity of pain and evaluate response   Implement non-pharmacological measures as appropriate and evaluate response  3/4/2025 0235 by Khloe Gong RN  Outcome: Progressing  Flowsheets (Taken 3/4/2025 0235)  Verbalizes/displays adequate comfort level or baseline comfort level:   Encourage patient to monitor pain and request assistance   Assess pain using appropriate pain scale   Administer analgesics based on type and severity of pain and evaluate response   Implement non-pharmacological measures as appropriate and evaluate response  3/4/2025 0234 by Khloe Gong RN  Outcome: Progressing  Flowsheets (Taken 3/4/2025 0234)  Verbalizes/displays adequate comfort level or baseline comfort level:   Encourage patient to monitor pain and request assistance   Assess pain using appropriate pain scale   Administer analgesics based on type and severity of pain and evaluate response   Implement non-pharmacological measures as appropriate and evaluate response     Problem: Safety - Adult  Goal: Free from fall injury  3/4/2025 0351 by Khloe Gong RN  Outcome: Progressing  3/4/2025 0235 by Khloe Gong RN  Outcome: Progressing  3/4/2025 0234 by Khloe Gong RN  Outcome: Progressing     Problem: Discharge Planning  Goal: Discharge to home or other facility with appropriate resources  3/4/2025 0351 by Khloe Gong RN  Outcome: Progressing  3/4/2025 0235 by Khloe Gong RN  Outcome: Progressing  3/4/2025 0234 by Khloe Gong RN  Outcome: Progressing     Problem: Chronic Conditions  and Co-morbidities  Goal: Patient's chronic conditions and co-morbidity symptoms are monitored and maintained or improved  3/4/2025 0351 by Khloe Gong RN  Outcome: Progressing  3/4/2025 0235 by Khloe Gong RN  Outcome: Progressing  3/4/2025 0234 by Khloe Gong RN  Outcome: Progressing     Problem: Nutrition  Goal: Nutrient intake appropriate for maintaining nutritional needs  3/4/2025 0351 by Khloe Gong RN  Outcome: Progressing  3/4/2025 0235 by Khloe Gong RN  Outcome: Progressing  3/4/2025 0234 by Khloe Gong RN  Outcome: Progressing     Problem: Fall/Injury  Goal: Not fall by end of shift  3/4/2025 0351 by Khloe Gong RN  Outcome: Progressing  3/4/2025 0235 by Khloe Gong RN  Outcome: Progressing  3/4/2025 0234 by Khloe Gong RN  Outcome: Progressing  Goal: Be free from injury by end of the shift  3/4/2025 0351 by Khloe Gong RN  Outcome: Progressing  3/4/2025 0235 by Khloe Gong RN  Outcome: Progressing  3/4/2025 0234 by Khloe Gong RN  Outcome: Progressing  Goal: Verbalize understanding of personal risk factors for fall in the hospital  3/4/2025 0351 by Khloe Gong RN  Outcome: Progressing  3/4/2025 0235 by Khloe Gong RN  Outcome: Progressing  3/4/2025 0234 by Khloe Gong RN  Outcome: Progressing  Goal: Verbalize understanding of risk factor reduction measures to prevent injury from fall in the home  3/4/2025 0351 by Khloe Gong RN  Outcome: Progressing  3/4/2025 0235 by Khloe Gong RN  Outcome: Progressing  3/4/2025 0234 by Khloe Gong RN  Outcome: Progressing  Goal: Use assistive devices by end of the shift  3/4/2025 0351 by Khloe Gong RN  Outcome: Progressing  3/4/2025 0235 by Khloe Gong RN  Outcome: Progressing  3/4/2025 0234 by Khloe Gong RN  Outcome: Progressing  Goal: Pace activities to prevent fatigue by end of the shift  3/4/2025 0351 by Khloe MADISON  ANNA Gong  Outcome: Progressing  3/4/2025 0235 by Khloe Gong RN  Outcome: Progressing  3/4/2025 0234 by Khloe Gong RN  Outcome: Progressing     Problem: Respiratory  Goal: Clear secretions with interventions this shift  3/4/2025 0351 by Khloe Gong RN  Outcome: Progressing  3/4/2025 0235 by Khloe Gong RN  Outcome: Progressing  3/4/2025 0234 by Khloe Gong RN  Outcome: Progressing  Goal: Minimize anxiety/maximize coping throughout shift  3/4/2025 0351 by Khloe Gong RN  Outcome: Progressing  3/4/2025 0235 by Khloe Gong RN  Outcome: Progressing  3/4/2025 0234 by Khloe Gong RN  Outcome: Progressing  Goal: Minimal/no exertional discomfort or dyspnea this shift  3/4/2025 0351 by Khloe Gong RN  Outcome: Progressing  3/4/2025 0235 by Khloe Gong RN  Outcome: Progressing  3/4/2025 0234 by Khloe Gong RN  Outcome: Progressing  Goal: No signs of respiratory distress (eg. Use of accessory muscles. Peds grunting)  3/4/2025 0351 by Khloe Gong RN  Outcome: Progressing  3/4/2025 0235 by Khloe Gong RN  Outcome: Progressing  3/4/2025 0234 by Khloe Gong RN  Outcome: Progressing  Goal: Patent airway maintained this shift  3/4/2025 0351 by Khloe Gong RN  Outcome: Progressing  3/4/2025 0235 by Khloe Gong RN  Outcome: Progressing  3/4/2025 0234 by Khloe Gong RN  Outcome: Progressing  Goal: Tolerate mechanical ventilation evidenced by VS/agitation level this shift  3/4/2025 0351 by Khloe Gong RN  Outcome: Progressing  3/4/2025 0235 by Khloe Gong RN  Outcome: Progressing  3/4/2025 0234 by Khloe Gong RN  Outcome: Progressing  Goal: Tolerate pulmonary toileting this shift  3/4/2025 0351 by Khloe Gong, RN  Outcome: Progressing  3/4/2025 0235 by Khloe Gong RN  Outcome: Progressing  3/4/2025 0234 by Khloe Gong, RN  Outcome: Progressing  Goal: Verbalize decreased  shortness of breath this shift  3/4/2025 0351 by Khloe Gong RN  Outcome: Progressing  3/4/2025 0235 by Khloe Gong RN  Outcome: Progressing  3/4/2025 0234 by Khloe Gong RN  Outcome: Progressing  Goal: Wean oxygen to maintain O2 saturation per order/standard this shift  3/4/2025 0351 by Khloe Gong RN  Outcome: Progressing  3/4/2025 0235 by Khloe Gong RN  Outcome: Progressing  3/4/2025 0234 by Khloe Gong RN  Outcome: Progressing  Goal: Increase self care and/or family involvement in next 24 hours  3/4/2025 0351 by Khloe Gong RN  Outcome: Progressing  3/4/2025 0235 by Khloe Gong RN  Outcome: Progressing  3/4/2025 0234 by Khloe Gong RN  Outcome: Progressing     Problem: Skin  Goal: Decreased wound size/increased tissue granulation at next dressing change  Outcome: Progressing  Flowsheets (Taken 3/4/2025 0351)  Decreased wound size/increased tissue granulation at next dressing change:   Promote sleep for wound healing   Protective dressings over bony prominences  Goal: Participates in plan/prevention/treatment measures  Outcome: Progressing  Flowsheets (Taken 3/4/2025 0351)  Participates in plan/prevention/treatment measures:   Discuss with provider PT/OT consult   Elevate heels   Increase activity/out of bed for meals  Goal: Prevent/manage excess moisture  Outcome: Progressing  Flowsheets (Taken 3/4/2025 0351)  Prevent/manage excess moisture:   Moisturize dry skin   Cleanse incontinence/protect with barrier cream  Goal: Prevent/minimize sheer/friction injuries  Outcome: Progressing  Flowsheets (Taken 3/4/2025 0351)  Prevent/minimize sheer/friction injuries:   Increase activity/out of bed for meals   HOB 30 degrees or less  Goal: Promote/optimize nutrition  Outcome: Progressing  Flowsheets (Taken 3/4/2025 0351)  Promote/optimize nutrition:   Monitor/record intake including meals   Consume > 50% meals/supplements  Goal: Promote skin healing  Outcome:  Progressing  Flowsheets (Taken 3/4/2025 0351)  Promote skin healing: Assess skin/pad under line(s)/device(s)   The patient's goals for the shift include      The clinical goals for the shift include Pt will be free from respiratory distress during my shift.

## 2025-03-04 NOTE — CARE PLAN
Problem: Pain - Adult  Goal: Verbalizes/displays adequate comfort level or baseline comfort level  Outcome: Progressing  Flowsheets (Taken 3/4/2025 0234)  Verbalizes/displays adequate comfort level or baseline comfort level:   Encourage patient to monitor pain and request assistance   Assess pain using appropriate pain scale   Administer analgesics based on type and severity of pain and evaluate response   Implement non-pharmacological measures as appropriate and evaluate response     Problem: Safety - Adult  Goal: Free from fall injury  Outcome: Progressing     Problem: Discharge Planning  Goal: Discharge to home or other facility with appropriate resources  Outcome: Progressing     Problem: Chronic Conditions and Co-morbidities  Goal: Patient's chronic conditions and co-morbidity symptoms are monitored and maintained or improved  Outcome: Progressing     Problem: Nutrition  Goal: Nutrient intake appropriate for maintaining nutritional needs  Outcome: Progressing     Problem: Fall/Injury  Goal: Not fall by end of shift  Outcome: Progressing  Goal: Be free from injury by end of the shift  Outcome: Progressing  Goal: Verbalize understanding of personal risk factors for fall in the hospital  Outcome: Progressing  Goal: Verbalize understanding of risk factor reduction measures to prevent injury from fall in the home  Outcome: Progressing  Goal: Use assistive devices by end of the shift  Outcome: Progressing  Goal: Pace activities to prevent fatigue by end of the shift  Outcome: Progressing     Problem: Respiratory  Goal: Clear secretions with interventions this shift  Outcome: Progressing  Goal: Minimize anxiety/maximize coping throughout shift  Outcome: Progressing  Goal: Minimal/no exertional discomfort or dyspnea this shift  Outcome: Progressing  Goal: No signs of respiratory distress (eg. Use of accessory muscles. Peds grunting)  Outcome: Progressing  Goal: Patent airway maintained this shift  Outcome:  Progressing  Goal: Tolerate mechanical ventilation evidenced by VS/agitation level this shift  Outcome: Progressing  Goal: Tolerate pulmonary toileting this shift  Outcome: Progressing  Goal: Verbalize decreased shortness of breath this shift  Outcome: Progressing  Goal: Wean oxygen to maintain O2 saturation per order/standard this shift  Outcome: Progressing  Goal: Increase self care and/or family involvement in next 24 hours  Outcome: Progressing   The patient's goals for the shift include      The clinical goals for the shift include

## 2025-03-04 NOTE — H&P
[facility]    Hospital Medicine History & Physical     Assessment & Plan     ASSESSMENT    79F with hx of hypertension and suspected dementia presents with generalized weakness and cough and found to have acute hypoxic respiratory failure and sepsis secondary to influenza A pneumonia with concern for secondary bacterial pneumonia.    PLAN    Acute hypoxic respiratory failure  -On adm, needing 2 L O2 to keep sats greater than 90%  -Suspect secondary to influenza and bacterial pneumonia below, wean O2 as able    Sepsis 2/2 Secondary Bacterial Pneumonia  -On admission, met SIRS criteria with evidence of pulmonary source  -Ceftriaxone 2 g daily  -Doxycycline 100 mg twice daily    Influenza A pneumonia  -Tamiflu 75 mg twice daily    Other issues  -Type II MI: Suspect demand in setting of above, treatment per above  -Essential hypertension: Home medications  -Suspected dementia: Noted chart, suspect at mental baseline currently  -Generalized weakness: Physical therapy consultation    DVT Prophy  -SCDs    Disposition  -[facility]      Mike Elkins MD    History of Present Illness     Jenifer Means is a 79 y.o.-year-old with hx of hypertension and suspected dementia presents with generalized weakness and cough.  Patient was in her normal state of health until about 2 weeks ago when her daughter started noticing that she was weaker than usual and has been coughing.  This is gotten progressively worse since then.  Today patient went to get out of bed and slid down onto the floor.  Did not actually fall.  Did not hit her head.  No LOC event.  Patient currently denies shortness of breath, fevers, or chills.  No lower extremity edema.  No sick contacts at home.  In the ED, needing 4 L O2 to keep sats greater than 90%.  Pulse 112.  WBC 13.0.  Found to be positive for influenza A.  CXR with evidence of right-sided infiltrate.  Started on antibiotics and Tamiflu and admitted to  "medicine.    Review of Systems     A Comprehensive greater than 10 system review of systems was carried out.  Pertinent positives and negatives are noted above.  Otherwise negative for contributory information.     Past Medical History   History reviewed. No pertinent past medical history.    Medications     No current facility-administered medications on file prior to encounter.     Current Outpatient Medications on File Prior to Encounter   Medication Sig Dispense Refill    albuterol 90 mcg/actuation inhaler Inhale 2 puffs every 4 hours if needed for wheezing. 6.7 g 0    aspirin 81 mg chewable tablet Chew 1 tablet (81 mg) once daily.      hydroCHLOROthiazide (Microzide) 12.5 mg capsule Take 1 capsule (12.5 mg) by mouth once every 24 hours.      lidocaine (Lidoderm) 5 % patch Place 1 patch on the skin in affected area and leave on for 12 hours. Remove for 12 hours 10 patch 0    lisinopril 40 mg tablet Take 1 tablet (40 mg) by mouth once daily.      methocarbamol (Robaxin) 500 mg tablet Take 1 tablet (500 mg) by mouth 2 times a day for 10 days. 20 tablet 0    pantoprazole (ProtoNix) 40 mg EC tablet Take 1 tablet (40 mg) by mouth once daily.      simvastatin (Zocor) 20 mg tablet Take 1 tablet (20 mg) by mouth once daily.        Past Surgical History   History reviewed. No pertinent surgical history.     Family History   Reviewed and non-contributory to presenting complaints    Allergies     Allergies   Allergen Reactions    Prednisone Other     Can tolerate IM, not PO    Niacin Rash     Social History     Social History     Tobacco Use    Smoking status: Never    Smokeless tobacco: Never   Substance Use Topics    Alcohol use: Never     Physical Exam   Blood pressure 147/75, pulse (!) 112, temperature 36.9 °C (98.4 °F), resp. rate 17, height 1.651 m (5' 5\"), weight 78.9 kg (174 lb), SpO2 96%.    General: Ill appearing, cooperative with exam, in NAD.  HEENT: Atraumatic. No erythema in posterior pharynx.  Lymph: No " cervical or inguinal lymphadenopathy.  Cardiac: RRR. No murmurs.  Lungs: Scattered rhonchi. Nl WOB.  Abd: Non-tender. No rebound or gaurding. Nl bowel sounds.  Ext: No edema. 2+ pulses.  Skin: No rashes, abrasions, or contusions.  Neuro: 5/5 strength. Sensation intact.    Labs & Imaging     Reviewed and Pertinent results discussed in assessment and plan.

## 2025-03-05 LAB
ANION GAP SERPL CALC-SCNC: 15 MMOL/L (ref 10–20)
BACTERIA UR CULT: NORMAL
BUN SERPL-MCNC: 16 MG/DL (ref 6–23)
CALCIUM SERPL-MCNC: 9.2 MG/DL (ref 8.6–10.3)
CHLORIDE SERPL-SCNC: 94 MMOL/L (ref 98–107)
CO2 SERPL-SCNC: 33 MMOL/L (ref 21–32)
CREAT SERPL-MCNC: 0.84 MG/DL (ref 0.5–1.05)
EGFRCR SERPLBLD CKD-EPI 2021: 72 ML/MIN/1.73M*2
ERYTHROCYTE [DISTWIDTH] IN BLOOD BY AUTOMATED COUNT: 14.1 % (ref 11.5–14.5)
GLUCOSE SERPL-MCNC: 90 MG/DL (ref 74–99)
HCT VFR BLD AUTO: 35.3 % (ref 36–46)
HGB BLD-MCNC: 11.1 G/DL (ref 12–16)
MCH RBC QN AUTO: 25.9 PG (ref 26–34)
MCHC RBC AUTO-ENTMCNC: 31.4 G/DL (ref 32–36)
MCV RBC AUTO: 83 FL (ref 80–100)
NRBC BLD-RTO: 0 /100 WBCS (ref 0–0)
PLATELET # BLD AUTO: 426 X10*3/UL (ref 150–450)
POTASSIUM SERPL-SCNC: 4.1 MMOL/L (ref 3.5–5.3)
RBC # BLD AUTO: 4.28 X10*6/UL (ref 4–5.2)
SODIUM SERPL-SCNC: 138 MMOL/L (ref 136–145)
WBC # BLD AUTO: 14.2 X10*3/UL (ref 4.4–11.3)

## 2025-03-05 PROCEDURE — 36415 COLL VENOUS BLD VENIPUNCTURE: CPT | Performed by: INTERNAL MEDICINE

## 2025-03-05 PROCEDURE — 94640 AIRWAY INHALATION TREATMENT: CPT

## 2025-03-05 PROCEDURE — 2500000001 HC RX 250 WO HCPCS SELF ADMINISTERED DRUGS (ALT 637 FOR MEDICARE OP): Performed by: INTERNAL MEDICINE

## 2025-03-05 PROCEDURE — 97116 GAIT TRAINING THERAPY: CPT | Mod: CQ,GP

## 2025-03-05 PROCEDURE — 2500000004 HC RX 250 GENERAL PHARMACY W/ HCPCS (ALT 636 FOR OP/ED): Performed by: INTERNAL MEDICINE

## 2025-03-05 PROCEDURE — 2500000002 HC RX 250 W HCPCS SELF ADMINISTERED DRUGS (ALT 637 FOR MEDICARE OP, ALT 636 FOR OP/ED): Performed by: INTERNAL MEDICINE

## 2025-03-05 PROCEDURE — 85027 COMPLETE CBC AUTOMATED: CPT | Performed by: INTERNAL MEDICINE

## 2025-03-05 PROCEDURE — 2500000005 HC RX 250 GENERAL PHARMACY W/O HCPCS: Performed by: INTERNAL MEDICINE

## 2025-03-05 PROCEDURE — 1210000001 HC SEMI-PRIVATE ROOM DAILY

## 2025-03-05 PROCEDURE — 80048 BASIC METABOLIC PNL TOTAL CA: CPT | Performed by: INTERNAL MEDICINE

## 2025-03-05 PROCEDURE — 2500000002 HC RX 250 W HCPCS SELF ADMINISTERED DRUGS (ALT 637 FOR MEDICARE OP, ALT 636 FOR OP/ED)

## 2025-03-05 PROCEDURE — 87040 BLOOD CULTURE FOR BACTERIA: CPT | Mod: PORLAB | Performed by: INTERNAL MEDICINE

## 2025-03-05 PROCEDURE — 99233 SBSQ HOSP IP/OBS HIGH 50: CPT | Performed by: INTERNAL MEDICINE

## 2025-03-05 RX ORDER — PANTOPRAZOLE SODIUM 40 MG/1
40 TABLET, DELAYED RELEASE ORAL DAILY
Status: DISCONTINUED | OUTPATIENT
Start: 2025-03-05 | End: 2025-03-09 | Stop reason: HOSPADM

## 2025-03-05 RX ORDER — SIMVASTATIN 20 MG/1
40 TABLET, FILM COATED ORAL NIGHTLY
Status: DISCONTINUED | OUTPATIENT
Start: 2025-03-05 | End: 2025-03-09 | Stop reason: HOSPADM

## 2025-03-05 RX ORDER — NAPROXEN SODIUM 220 MG/1
81 TABLET, FILM COATED ORAL DAILY
Status: DISCONTINUED | OUTPATIENT
Start: 2025-03-05 | End: 2025-03-09 | Stop reason: HOSPADM

## 2025-03-05 RX ORDER — LISINOPRIL 20 MG/1
40 TABLET ORAL DAILY
Status: DISCONTINUED | OUTPATIENT
Start: 2025-03-05 | End: 2025-03-09 | Stop reason: HOSPADM

## 2025-03-05 RX ORDER — LANOLIN ALCOHOL/MO/W.PET/CERES
1000 CREAM (GRAM) TOPICAL DAILY
Status: DISCONTINUED | OUTPATIENT
Start: 2025-03-05 | End: 2025-03-09 | Stop reason: HOSPADM

## 2025-03-05 RX ORDER — HYDROCHLOROTHIAZIDE 12.5 MG/1
12.5 CAPSULE ORAL EVERY 24 HOURS
Status: DISCONTINUED | OUTPATIENT
Start: 2025-03-05 | End: 2025-03-09 | Stop reason: HOSPADM

## 2025-03-05 RX ORDER — OSELTAMIVIR PHOSPHATE 30 MG/1
30 CAPSULE ORAL 2 TIMES DAILY
Status: DISCONTINUED | OUTPATIENT
Start: 2025-03-05 | End: 2025-03-07

## 2025-03-05 RX ADMIN — IPRATROPIUM BROMIDE AND ALBUTEROL SULFATE 3 ML: 2.5; .5 SOLUTION RESPIRATORY (INHALATION) at 11:07

## 2025-03-05 RX ADMIN — HYDROCHLOROTHIAZIDE 12.5 MG: 12.5 CAPSULE ORAL at 10:46

## 2025-03-05 RX ADMIN — PANTOPRAZOLE SODIUM 40 MG: 40 TABLET, DELAYED RELEASE ORAL at 10:46

## 2025-03-05 RX ADMIN — LISINOPRIL 40 MG: 20 TABLET ORAL at 10:46

## 2025-03-05 RX ADMIN — Medication 1000 MCG: at 10:46

## 2025-03-05 RX ADMIN — Medication 2 L/MIN: at 19:28

## 2025-03-05 RX ADMIN — IPRATROPIUM BROMIDE AND ALBUTEROL SULFATE 3 ML: 2.5; .5 SOLUTION RESPIRATORY (INHALATION) at 07:40

## 2025-03-05 RX ADMIN — ASPIRIN 81 MG CHEWABLE TABLET 81 MG: 81 TABLET CHEWABLE at 10:46

## 2025-03-05 RX ADMIN — IPRATROPIUM BROMIDE AND ALBUTEROL SULFATE 3 ML: 2.5; .5 SOLUTION RESPIRATORY (INHALATION) at 19:27

## 2025-03-05 RX ADMIN — OSELTAMAVIR PHOSPHATE 75 MG: 75 CAPSULE ORAL at 08:05

## 2025-03-05 RX ADMIN — Medication 2 L/MIN: at 11:07

## 2025-03-05 RX ADMIN — SIMVASTATIN 40 MG: 20 TABLET, FILM COATED ORAL at 21:11

## 2025-03-05 RX ADMIN — Medication 2 L/MIN: at 07:45

## 2025-03-05 RX ADMIN — CEFTRIAXONE 2 G: 2 INJECTION, SOLUTION INTRAVENOUS at 08:05

## 2025-03-05 RX ADMIN — OSELTAMAVIR PHOSPHATE 30 MG: 30 CAPSULE ORAL at 21:11

## 2025-03-05 RX ADMIN — Medication 3 L/MIN: at 21:14

## 2025-03-05 RX ADMIN — DOXYCYCLINE 100 MG: 100 CAPSULE ORAL at 08:05

## 2025-03-05 ASSESSMENT — ENCOUNTER SYMPTOMS
SHORTNESS OF BREATH: 1
NERVOUS/ANXIOUS: 0
SORE THROAT: 0
LIGHT-HEADEDNESS: 0
WOUND: 0
DIAPHORESIS: 0
CHOKING: 0
FACIAL SWELLING: 0
CONSTIPATION: 0
DYSURIA: 0
FATIGUE: 0
CHEST TIGHTNESS: 0
AGITATION: 0
CHILLS: 0
COUGH: 1
HEMATURIA: 0
CONFUSION: 0
WHEEZING: 0
HALLUCINATIONS: 0
NECK PAIN: 0
VOMITING: 0
ACTIVITY CHANGE: 1
BACK PAIN: 0
FEVER: 0
JOINT SWELLING: 0
DIARRHEA: 0
ABDOMINAL PAIN: 0
PALPITATIONS: 0
WEAKNESS: 1
NAUSEA: 0

## 2025-03-05 ASSESSMENT — COGNITIVE AND FUNCTIONAL STATUS - GENERAL
WALKING IN HOSPITAL ROOM: A LOT
TURNING FROM BACK TO SIDE WHILE IN FLAT BAD: A LITTLE
PERSONAL GROOMING: A LITTLE
DRESSING REGULAR LOWER BODY CLOTHING: A LOT
TURNING FROM BACK TO SIDE WHILE IN FLAT BAD: A LOT
TOILETING: A LOT
DAILY ACTIVITIY SCORE: 16
MOBILITY SCORE: 12
MOVING FROM LYING ON BACK TO SITTING ON SIDE OF FLAT BED WITH BEDRAILS: A LITTLE
MOVING TO AND FROM BED TO CHAIR: A LOT
EATING MEALS: A LITTLE
WALKING IN HOSPITAL ROOM: A LOT
TOILETING: A LOT
CLIMB 3 TO 5 STEPS WITH RAILING: TOTAL
DRESSING REGULAR UPPER BODY CLOTHING: A LITTLE
STANDING UP FROM CHAIR USING ARMS: A LOT
EATING MEALS: A LITTLE
MOVING TO AND FROM BED TO CHAIR: A LITTLE
MOBILITY SCORE: 15
MOBILITY SCORE: 12
PERSONAL GROOMING: A LITTLE
CLIMB 3 TO 5 STEPS WITH RAILING: TOTAL
MOVING FROM LYING ON BACK TO SITTING ON SIDE OF FLAT BED WITH BEDRAILS: A LITTLE
HELP NEEDED FOR BATHING: A LITTLE
HELP NEEDED FOR BATHING: A LITTLE
MOVING TO AND FROM BED TO CHAIR: A LOT
DRESSING REGULAR UPPER BODY CLOTHING: A LITTLE
DRESSING REGULAR LOWER BODY CLOTHING: A LOT
CLIMB 3 TO 5 STEPS WITH RAILING: A LOT
TURNING FROM BACK TO SIDE WHILE IN FLAT BAD: A LOT
STANDING UP FROM CHAIR USING ARMS: A LOT
STANDING UP FROM CHAIR USING ARMS: A LOT
MOVING FROM LYING ON BACK TO SITTING ON SIDE OF FLAT BED WITH BEDRAILS: A LITTLE
WALKING IN HOSPITAL ROOM: A LOT
DAILY ACTIVITIY SCORE: 16

## 2025-03-05 ASSESSMENT — PAIN SCALES - GENERAL
PAINLEVEL_OUTOF10: 0 - NO PAIN

## 2025-03-05 ASSESSMENT — PAIN - FUNCTIONAL ASSESSMENT
PAIN_FUNCTIONAL_ASSESSMENT: 0-10
PAIN_FUNCTIONAL_ASSESSMENT: 0-10

## 2025-03-05 NOTE — PROGRESS NOTES
Jenifer Means is a 79 y.o. female on day 1 of admission presenting with Influenza A.    Review of Systems   Constitutional:  Positive for activity change. Negative for chills, diaphoresis, fatigue and fever.   HENT:  Negative for congestion, facial swelling, sneezing and sore throat.    Respiratory:  Positive for cough and shortness of breath. Negative for choking, chest tightness and wheezing.    Cardiovascular:  Negative for chest pain, palpitations and leg swelling.   Gastrointestinal:  Negative for abdominal pain, constipation, diarrhea, nausea and vomiting.   Genitourinary:  Negative for dysuria, hematuria and urgency.   Musculoskeletal:  Negative for back pain, gait problem, joint swelling and neck pain.   Skin:  Negative for rash and wound.   Neurological:  Positive for weakness. Negative for syncope and light-headedness.   Psychiatric/Behavioral:  Negative for agitation, confusion and hallucinations. The patient is not nervous/anxious.    All other systems reviewed and are negative.     Subjective   Jeinfer Means is a 79 y.o.-year-old with hx of hypertension and suspected dementia presents with generalized weakness and cough.  Patient was in her normal state of health until about 2 weeks ago when her daughter started noticing that she was weaker than usual and has been coughing.  This is gotten progressively worse since then.  Today patient went to get out of bed and slid down onto the floor.  Did not actually fall.  Did not hit her head.  No LOC event.  Patient currently denies shortness of breath, fevers, or chills.  No lower extremity edema.  No sick contacts at home.  In the ED, needing 4 L O2 to keep sats greater than 90%.  Pulse 112.  WBC 13.0.  Found to be positive for influenza A.  CXR with evidence of right-sided infiltrate.  Started on antibiotics and Tamiflu and admitted to medicine.     3/4: Patient seen.  Remains on supplemental O2.  Feels fatigued, complains of myalgias.  Continue  treatment for influenza A.  No urinary symptoms.  Discussed with family in room.  Will need another 24 to 48 hours of treatment.  Reassess in AM.  3/5: Patient seen.  Down to 2 L O2 by nasal cannula.  Cough is less productive.  Still feels tired.  Weak voice.  Patient is growing GPC from 2 sets of blood cultures.  Patient remains on ceftriaxone and doxycycline.  Will place consult to ID.  Await results before considering 2D echo.       Objective     Last Recorded Vitals  /67 (BP Location: Left arm, Patient Position: Lying)   Pulse 83   Temp 37.2 °C (98.9 °F) (Temporal)   Resp 19   Wt 79.5 kg (175 lb 4.3 oz)   SpO2 93%   Intake/Output last 3 Shifts:    Intake/Output Summary (Last 24 hours) at 3/5/2025 0925  Last data filed at 3/5/2025 0805  Gross per 24 hour   Intake 1910 ml   Output 1500 ml   Net 410 ml       Admission Weight  Weight: 78.9 kg (174 lb) (03/03/25 2041)    Daily Weight  03/04/25 : 79.5 kg (175 lb 4.3 oz)      Physical Exam  Constitutional:       General: She is not in acute distress.  HENT:      Head: Normocephalic and atraumatic.      Nose: Nose normal. No congestion or rhinorrhea.      Mouth/Throat:      Mouth: Mucous membranes are dry.      Pharynx: Oropharynx is clear.   Eyes:      General: No scleral icterus.     Extraocular Movements: Extraocular movements intact.      Pupils: Pupils are equal, round, and reactive to light.   Cardiovascular:      Rate and Rhythm: Normal rate and regular rhythm.      Heart sounds: Normal heart sounds. No murmur heard.     No friction rub. No gallop.   Pulmonary:      Effort: Pulmonary effort is normal.      Breath sounds: Normal breath sounds. No wheezing, rhonchi or rales.   Chest:      Chest wall: No tenderness.   Abdominal:      General: There is no distension.      Palpations: Abdomen is soft.      Tenderness: There is no abdominal tenderness. There is no guarding or rebound.   Musculoskeletal:         General: No swelling, tenderness or signs of  injury. Normal range of motion.      Cervical back: Normal range of motion.   Skin:     General: Skin is warm and dry.      Coloration: Skin is not jaundiced.      Findings: No bruising, erythema or rash.   Neurological:      General: No focal deficit present.      Mental Status: She is alert and oriented to person, place, and time.          Lab Results  Results for orders placed or performed during the hospital encounter of 03/03/25 (from the past 24 hours)   Basic Metabolic Panel   Result Value Ref Range    Glucose 90 74 - 99 mg/dL    Sodium 138 136 - 145 mmol/L    Potassium 4.1 3.5 - 5.3 mmol/L    Chloride 94 (L) 98 - 107 mmol/L    Bicarbonate 33 (H) 21 - 32 mmol/L    Anion Gap 15 10 - 20 mmol/L    Urea Nitrogen 16 6 - 23 mg/dL    Creatinine 0.84 0.50 - 1.05 mg/dL    eGFR 72 >60 mL/min/1.73m*2    Calcium 9.2 8.6 - 10.3 mg/dL   CBC   Result Value Ref Range    WBC 14.2 (H) 4.4 - 11.3 x10*3/uL    nRBC 0.0 0.0 - 0.0 /100 WBCs    RBC 4.28 4.00 - 5.20 x10*6/uL    Hemoglobin 11.1 (L) 12.0 - 16.0 g/dL    Hematocrit 35.3 (L) 36.0 - 46.0 %    MCV 83 80 - 100 fL    MCH 25.9 (L) 26.0 - 34.0 pg    MCHC 31.4 (L) 32.0 - 36.0 g/dL    RDW 14.1 11.5 - 14.5 %    Platelets 426 150 - 450 x10*3/uL        Image Results  ECG 12 lead  Sinus tachycardia  Left atrial enlargement  Probable anteroseptal infarct, old    See ED provider note for full interpretation and clinical correlation  Confirmed by Sybil Billy ([id number]) on 3/4/2025 12:31:54 PM       Assessment/Plan     Assessment & Plan  Influenza A  Tamiflu 75 mg twice daily  Bacterial pneumonia  Sepsis 2/2 Secondary Bacterial Pneumonia  -On admission, met SIRS criteria with evidence of pulmonary source  -Ceftriaxone 2 g daily  -Doxycycline 100 mg twice daily  3/4: Blood cultures are negative at day 1.  No urinary antigens were sent.  Suspect pneumococcal pneumonia.  3/5: Blood cultures now growing GPC in pairs and chains.  Could be pneumococcal pneumonia.  Awaiting results.   Remains on ceftriaxone.  Consult ID.  Consider 2D echo if staph.  Acute hypoxemic respiratory failure (Multi)  -On adm, needing 2 L O2 to keep sats greater than 90%  -Suspect secondary to influenza and bacterial pneumonia below, wean O2 as able  3/4: Wean O2 as able.  3/4: Remains on 2 L.  Sepsis (Multi)  Sepsis 2/2 Secondary Bacterial Pneumonia  -On admission, met SIRS criteria with evidence of pulmonary source  -Ceftriaxone 2 g daily  -Doxycycline 100 mg twice daily  2/5: GPC bacteremia.  May be strep.  Consult ID.  Continue current antibiotics.  Troponin level elevated  Type II MI: Suspect demand in setting of above, treatment per above  Essential hypertension  Home meds  Hyperlipidemia  Home meds  Hypercholesterolemia  Home meds           Jones Espinosa MD

## 2025-03-05 NOTE — CARE PLAN
The patient's goals for the shift include      The clinical goals for the shift include pt remain free from falls and injuries, vitals remain stable, control pain, participate in pressure redistribution    Over the shift, the patient did not make progress toward the following goals. Barriers to progression include . Recommendations to address these barriers include .

## 2025-03-05 NOTE — PROGRESS NOTES
Occupational Therapy                 Therapy Communication Note    Patient Name: Jenifer Means  MRN: [id number]  Department: [facility] 3 E  Room: [facility]  Today's Date: 3/5/2025     Discipline: Occupational Therapy    OT Missed Visit: Yes     Missed Visit Reason: Missed Visit Reason:  (pt. asleep.)    Missed Time: Attempt    Comment:

## 2025-03-05 NOTE — PROGRESS NOTES
Pharmacy - Antimicrobial dosing adjustment     Per System Dosing Policy     Dose adjustment from oseltamivir 75mg BID to oseltamivir 30mg BID due to CrCl 58.4      Mary Verma, PharmD

## 2025-03-05 NOTE — ASSESSMENT & PLAN NOTE
-On adm, needing 2 L O2 to keep sats greater than 90%  -Suspect secondary to influenza and bacterial pneumonia below, wean O2 as able  3/4: Wean O2 as able.  3/4: Remains on 2 L.

## 2025-03-05 NOTE — CARE PLAN
The patient's goals for the shift include        Problem: Safety - Adult  Goal: Free from fall injury  Outcome: Progressing     Problem: Discharge Planning  Goal: Discharge to home or other facility with appropriate resources  Outcome: Progressing     Problem: Chronic Conditions and Co-morbidities  Goal: Patient's chronic conditions and co-morbidity symptoms are monitored and maintained or improved  Outcome: Progressing     Problem: Nutrition  Goal: Nutrient intake appropriate for maintaining nutritional needs  Outcome: Progressing     Problem: Fall/Injury  Goal: Not fall by end of shift  Outcome: Progressing  Goal: Be free from injury by end of the shift  Outcome: Progressing  Goal: Verbalize understanding of personal risk factors for fall in the hospital  Outcome: Progressing  Goal: Verbalize understanding of risk factor reduction measures to prevent injury from fall in the home  Outcome: Progressing  Goal: Use assistive devices by end of the shift  Outcome: Progressing  Goal: Pace activities to prevent fatigue by end of the shift  Outcome: Progressing     Problem: Respiratory  Goal: Clear secretions with interventions this shift  Outcome: Progressing  Goal: Minimize anxiety/maximize coping throughout shift  Outcome: Progressing  Goal: Minimal/no exertional discomfort or dyspnea this shift  Outcome: Progressing  Goal: No signs of respiratory distress (eg. Use of accessory muscles. Peds grunting)  Outcome: Progressing  Goal: Patent airway maintained this shift  Outcome: Progressing  Goal: Tolerate mechanical ventilation evidenced by VS/agitation level this shift  Outcome: Progressing  Goal: Tolerate pulmonary toileting this shift  Outcome: Progressing  Goal: Verbalize decreased shortness of breath this shift  Outcome: Progressing  Goal: Wean oxygen to maintain O2 saturation per order/standard this shift  Outcome: Progressing  Goal: Increase self care and/or family involvement in next 24 hours  Outcome:  Progressing     Problem: Skin  Goal: Decreased wound size/increased tissue granulation at next dressing change  Outcome: Progressing  Flowsheets (Taken 3/5/2025 0721)  Decreased wound size/increased tissue granulation at next dressing change:   Promote sleep for wound healing   Protective dressings over bony prominences   Utilize specialty bed per algorithm  Goal: Participates in plan/prevention/treatment measures  Outcome: Progressing  Flowsheets (Taken 3/5/2025 0721)  Participates in plan/prevention/treatment measures:   Discuss with provider PT/OT consult   Elevate heels  Goal: Prevent/manage excess moisture  Outcome: Progressing  Flowsheets (Taken 3/5/2025 0721)  Prevent/manage excess moisture:   Follow provider orders for dressing changes   Moisturize dry skin   Monitor for/manage infection if present  Goal: Prevent/minimize sheer/friction injuries  Outcome: Progressing  Flowsheets (Taken 3/5/2025 0721)  Prevent/minimize sheer/friction injuries:   HOB 30 degrees or less   Increase activity/out of bed for meals   Turn/reposition every 2 hours/use positioning/transfer devices   Use pull sheet  Goal: Promote/optimize nutrition  Outcome: Progressing  Flowsheets (Taken 3/5/2025 0721)  Promote/optimize nutrition:   Monitor/record intake including meals   Offer water/supplements/favorite foods   Consume > 50% meals/supplements  Goal: Promote skin healing  Outcome: Progressing  Flowsheets (Taken 3/5/2025 0721)  Promote skin healing:   Ensure correct size (line/device) and apply per  instructions   Assess skin/pad under line(s)/device(s)   Protective dressings over bony prominences   Rotate device position/do not position patient on device   Turn/reposition every 2 hours/use positioning/transfer devices     Problem: Pain  Goal: Takes deep breaths with improved pain control throughout the shift  Outcome: Progressing  Goal: Turns in bed with improved pain control throughout the shift  Outcome:  Progressing  Goal: Walks with improved pain control throughout the shift  Outcome: Progressing  Goal: Performs ADL's with improved pain control throughout shift  Outcome: Progressing  Goal: Participates in PT with improved pain control throughout the shift  Outcome: Progressing  Goal: Free from opioid side effects throughout the shift  Outcome: Progressing  Goal: Free from acute confusion related to pain meds throughout the shift  Outcome: Progressing

## 2025-03-05 NOTE — CONSULTS
Infectious Disease Inpatient Consult    Consults  Referred by Dr Jesús Post MD: Jose Rebolledo MD    Reason For Consult  Flu A and bacteremia    History Of Present Illness  Jenifer Means is a 79 y.o. female presenting with hx of hypertension and suspected dementia presents with generalized weakness and cough.  Patient was in her normal state of health until about 2 weeks ago when her daughter started noticing that she was weaker than usual and has been coughing.  This is gotten progressively worse since then.  Today patient went to get out of bed and slid down onto the floor.  Did not actually fall.  Did not hit her head.  No LOC event.  Patient currently denies shortness of breath, fevers, or chills.  No lower extremity edema.  No sick contacts at home.  In the ED, needing 4 L O2 to keep sats greater than 90%.  Pulse 112.  WBC 13.0.  Found to be positive for influenza A.  CXR with evidence of right-sided infiltrate.  Started on antibiotics and Tamiflu and admitted to medicine.      Past Medical History  She has no past medical history on file.    Surgical History  She has no past surgical history on file.     Social History     Occupational History    Not on file   Tobacco Use    Smoking status: Never    Smokeless tobacco: Never   Substance and Sexual Activity    Alcohol use: Never    Drug use: Never    Sexual activity: Not on file     Travel History   Travel since 02/05/25    No documented travel since 02/05/25          Family History  No family history on file.  Allergies  Prednisone and Niacin       There is no immunization history on file for this patient.  Medications  Home medications:  Medications Prior to Admission   Medication Sig Dispense Refill Last Dose/Taking    albuterol 90 mcg/actuation inhaler Inhale 2 puffs every 4 hours if needed for wheezing. 6.7 g 0     aspirin 81 mg chewable tablet Chew 1 tablet (81 mg) once daily.       cyanocobalamin (Vitamin B-12) 1,000 mcg tablet  Take 1 tablet (1,000 mcg) by mouth once daily.       hydroCHLOROthiazide (Microzide) 12.5 mg capsule Take 1 capsule (12.5 mg) by mouth once every 24 hours.   Morning    lidocaine (Lidoderm) 5 % patch Place 1 patch on the skin in affected area and leave on for 12 hours. Remove for 12 hours (Patient not taking: Reported on 3/4/2025) 10 patch 0 Not Taking    lisinopril 40 mg tablet Take 1 tablet (40 mg) by mouth once daily.       methocarbamol (Robaxin) 500 mg tablet Take 1 tablet (500 mg) by mouth 2 times a day for 10 days. (Patient not taking: Reported on 3/4/2025) 20 tablet 0 Not Taking    pantoprazole (ProtoNix) 40 mg EC tablet Take 1 tablet (40 mg) by mouth once daily.       simvastatin (Zocor) 20 mg tablet Take 1 tablet (20 mg) by mouth once daily.   Evening     Current medications:  Scheduled medications  aspirin, 81 mg, oral, Daily  cefTRIAXone, 2 g, intravenous, q24h  cyanocobalamin, 1,000 mcg, oral, Daily  doxycycline, 100 mg, oral, q12h FERNANDO  hydroCHLOROthiazide, 12.5 mg, oral, q24h  ipratropium-albuteroL, 3 mL, nebulization, TID  lisinopril, 40 mg, oral, Daily  oseltamivir, 30 mg, oral, BID  oxygen, , inhalation, Continuous - Inhalation  pantoprazole, 40 mg, oral, Daily  simvastatin, 40 mg, oral, Nightly      Continuous medications     PRN medications  PRN medications: acetaminophen **OR** acetaminophen **OR** acetaminophen, alum-mag hydroxide-simeth, benzocaine-menthol, dextromethorphan-guaifenesin, guaiFENesin, ipratropium-albuteroL, melatonin, ondansetron **OR** ondansetron, polyethylene glycol    Review of Systems     Objective  Range of Vitals (last 24 hours)  Heart Rate:  [83-98]   Temp:  [36.6 °C (97.8 °F)-37.5 °C (99.5 °F)]   Resp:  [17-20]   BP: (138-162)/(61-80)   SpO2:  [91 %-97 %]   Daily Weight  03/04/25 : 79.5 kg (175 lb 4.3 oz)    Body mass index is 29.17 kg/m².     Physical Exam  /71 (BP Location: Left arm, Patient Position: Sitting)   Pulse 98   Temp 36.9 °C (98.5 °F) (Temporal)    "Resp 17   Ht 1.651 m (5' 5\")   Wt 79.5 kg (175 lb 4.3 oz)   SpO2 94%   BMI 29.17 kg/m²   Temp (24hrs), Av °C (98.6 °F), Min:36.6 °C (97.8 °F), Max:37.5 °C (99.5 °F)      General: alert, oriented, NAD  Lungs: bilaterally clear to auscultation  Heart: regular rate and rhythm  Abdomen: soft, non tender, non distended, BS+  Extremities: no edema  No rashes  No joint inflammation  Neck supple  Lines ok  No CVAT     Relevant Results    Labs  Results from last 72 hours   Lab Units 25  0625   WBC AUTO x10*3/uL 14.2* 14.2* 13.0*   HEMOGLOBIN g/dL 11.1* 11.6* 12.1   HEMATOCRIT % 35.3* 35.7* 37.2   PLATELETS AUTO x10*3/uL 426 383 379   NEUTROS PCT AUTO %  --   --  87.0   LYMPHS PCT AUTO %  --   --  3.2   MONOS PCT AUTO %  --   --  5.8   EOS PCT AUTO %  --   --  0.0     Results from last 72 hours   Lab Units 25   SODIUM mmol/L 138 131* 132*   POTASSIUM mmol/L 4.1 3.5 3.6   CHLORIDE mmol/L 94* 91* 91*   CO2 mmol/L 33* 30 33*   BUN mg/dL 16 16 18   CREATININE mg/dL 0.84 0.61 0.69   GLUCOSE mg/dL 90 101* 119*   CALCIUM mg/dL 9.2 8.9 9.1   ANION GAP mmol/L 15 14 12   EGFR mL/min/1.73m*2 72 >90 90     Results from last 72 hours   Lab Units 25   ALK PHOS U/L 87   BILIRUBIN TOTAL mg/dL 0.9   PROTEIN TOTAL g/dL 7.2   ALT U/L 23   AST U/L 24   ALBUMIN g/dL 3.4     Estimated Creatinine Clearance: 58.4 mL/min (by C-G formula based on SCr of 0.84 mg/dL).  No results found for: \"CRP\", \"SEDRATE\"  No results found for: \"HIV1X2\", \"HIVCONF\", \"FKMYNB0KA\"  No results found for: \"HEPCABINIT\", \"HEPCAB\", \"HCVPCRQUANT\"  Microbiology  Susceptibility data from last 100 days.  Collected Specimen Info Organism   25 Blood culture from Peripheral Venipuncture Streptococcus pneumoniae   25 Blood culture from Peripheral Venipuncture Streptococcus pneumoniae             No lab exists for component: \"AGALPCRNB\"                        Imaging  ECG 12 " lead    Result Date: 3/4/2025  Sinus tachycardia Left atrial enlargement Probable anteroseptal infarct, old See ED provider note for full interpretation and clinical correlation Confirmed by Sybil Billy ([id number]) on 3/4/2025 12:31:54 PM    XR chest 2 views    Result Date: 3/3/2025  STUDY: Chest Radiographs;  03/03/2025; 09:54PM. INDICATION: Cough. COMPARISON: XR Chest 11/15/2024. ACCESSION NUMBER(S): [id number] ORDERING CLINICIAN: KAVYA EDOUARD TECHNIQUE:  Frontal and lateral chest. FINDINGS: CARDIOMEDIASTINAL SILHOUETTE: Cardiomediastinal silhouette is normal in size and configuration. Calcified plaque is seen in the aorta.  LUNGS: Bilateral basilar and right infrahilar airspace opacities are noted. Granulomatous calcifications are seen in both lungs.  There is a port overlying the left mid chest.  ABDOMEN: No remarkable upper abdominal findings.  BONES: No acute osseous changes.    Right infrahilar and bilateral basilar airspace disease, suggesting aspiration or multilobar pneumonia in the appropriate clinical setting.  Follow up chest radiograph is recommended 6-8 weeks after appropriate treatment to document resolution.. Signed by Jass Romano      Echo  No results found for this or any previous visit.    Assessment   Sepsis  CAP w strep pneumoniae  Influenza A  Acute hypoxic resp failure  HTN  HLD      Plan   Ceftriaxone  Tamiflu  Repeat Bcx x 2  Follow O2 levels  Follow MICs of the Bcx isolate    I spent 31 minutes in the professional and overall care of this patient.      Navi Loo MD

## 2025-03-05 NOTE — PROGRESS NOTES
"TCC (KATTY) spoke to Pt's /Rylan, SW reviewed Pt's PT/OT scores and advised Pt's  re: recommendation for inpatient rehab stay at SNF. Pt's  declined SNF, stated there are several adults in the home including Pt's , Pt's dtr and Pt's son-in-law, Pt's  stated same felt they would be able to accommodate Pt's needs at home. Agreeable to [facility], SW adivsed Pt's huisband of Pt's right to choice of provider, Pt's  requested agency choice list. SW generated choice list list and list was delivered to Pt's room. TCC (KATTY) to follow for [facility] choice.     1300 Pt reviewed in IDT rounds meeting, EDOD is 24-48 hours.      1525 T[facility] (KATTY) received return call from Pt's /Rylan, same advised family discussed provider choice and they would like to have a referral sent to [facility] for [facility] which is a significant [facility] from yesterday's evaluation. Pt's  asked SW what services [facility] would provide and SW reviewed the typical services and service schedule offered by [facility]. Pt's /Rylan stated \"that sounds like it should be just the ticket.\" Plan is for Pt to discharge to home w/ [facility] when medically ready.      03/05/25 1440   Discharge Planning   Expected Discharge Disposition Home H  ([facility] choice list provided)   Stroke Family Assessment   Stroke Family Assessment Needed No   Intensity of Service   Intensity of Service 0-30 min       "

## 2025-03-05 NOTE — ASSESSMENT & PLAN NOTE
Sepsis 2/2 Secondary Bacterial Pneumonia  -On admission, met SIRS criteria with evidence of pulmonary source  -Ceftriaxone 2 g daily  -Doxycycline 100 mg twice daily  3/4: Blood cultures are negative at day 1.  No urinary antigens were sent.  Suspect pneumococcal pneumonia.  3/5: Blood cultures now growing GPC in pairs and chains.  Could be pneumococcal pneumonia.  Awaiting results.  Remains on ceftriaxone.  Consult ID.  Consider 2D echo if staph.

## 2025-03-05 NOTE — ASSESSMENT & PLAN NOTE
Sepsis 2/2 Secondary Bacterial Pneumonia  -On admission, met SIRS criteria with evidence of pulmonary source  -Ceftriaxone 2 g daily  -Doxycycline 100 mg twice daily  2/5: GPC bacteremia.  May be strep.  Consult ID.  Continue current antibiotics.

## 2025-03-05 NOTE — PROGRESS NOTES
Physical Therapy  Physical Therapy Treatment    Patient Name: Jenifer Means  MRN: [id number]  Department: [address]  Room: [address] [place] [address]  Today's Date: 3/5/2025  Time Calculation  Start Time: 0857  Stop Time: 0920  Time Calculation (min): 23 min    PT Plan  Treatment/Interventions: Transfer training, Gait training, Balance training, Strengthening, Endurance training, Therapeutic exercise, Therapeutic activity, Home exercise program  PT Plan: Ongoing PT  PT Frequency: 3 times per week  PT Discharge Recommendations: Moderate intensity level of continued care  Equipment Recommended upon Discharge: Wheeled walker (TBD - pt has no AD at home.)  PT Recommended Transfer Status: Assist x1  PT - OK to Discharge: Yes (To next level of care when medically cleared.)    General Visit Information:   PT  Visit  PT Received On: 03/05/25  Response to Previous Treatment: Patient with no complaints from previous session.    Reason for Referral:   Influenza A, PNA. ADLs, safety assessment    Precautions:  Fall precautions    Pain:  No pain    Cognition:  Disoriented to time  Disoriented to situation    Activity Tolerance:  Activity Tolerance  Endurance: Tolerates 10 - 20 min exercise with multiple rests    Treatments:  Therapeutic Exercise  1x10 reps BLE therex in sitting, AROM    Transfers  Sit to stand: Minimum assistance  Stand to sit: Minimum assistance  Stand pivot: Minimum assistance    Ambulation/Gait Training  15 feet x2 reps with FWW, Julia        Pt sitting in bedside chair pre/post tx. Alarm on and call light in reach.    Outcome Measures:  [facility] Basic Mobility  Turning from your back to your side while in a flat bed without using bedrails: A little  Moving from lying on your back to sitting on the side of a flat bed without using bedrails: A little  Moving to and from bed to chair (including a wheelchair): A little  Standing up from a chair using your arms (e.g. wheelchair or bedside chair): A lot  To walk in hospital room: A  lot  Climbing 3-5 steps with railing: A lot  Basic Mobility - Total Score: 15    Education Documentation  Mobility Training, taught by Migue Holloway PTA at 3/5/2025 12:13 PM.  Learner: Patient  Readiness: Acceptance  Method: Explanation, Demonstration  Response: Needs Reinforcement    Education Comments  No comments found.        OP EDUCATION:       Encounter Problems       Encounter Problems (Active)       To improve strength, endurance, and balance:        Patient will participate in B LE exercise x 15 repetitions with PSO2 and HR WFL.  (Progressing)       Start:  03/04/25    Expected End:  03/18/25            Patient will complete transfers with CGA.  (Progressing)       Start:  03/04/25    Expected End:  03/18/25            Patient will ambulate 30 feet x 2 with WW and CGA with PSO2 and HR WFL.  (Progressing)       Start:  03/04/25    Expected End:  03/18/25

## 2025-03-06 PROBLEM — J13: Status: ACTIVE | Noted: 2025-03-04

## 2025-03-06 PROCEDURE — 2500000005 HC RX 250 GENERAL PHARMACY W/O HCPCS: Performed by: INTERNAL MEDICINE

## 2025-03-06 PROCEDURE — 99233 SBSQ HOSP IP/OBS HIGH 50: CPT | Performed by: INTERNAL MEDICINE

## 2025-03-06 PROCEDURE — 97530 THERAPEUTIC ACTIVITIES: CPT | Mod: GP,CQ

## 2025-03-06 PROCEDURE — 97110 THERAPEUTIC EXERCISES: CPT | Mod: GP,CQ

## 2025-03-06 PROCEDURE — 2500000002 HC RX 250 W HCPCS SELF ADMINISTERED DRUGS (ALT 637 FOR MEDICARE OP, ALT 636 FOR OP/ED): Performed by: INTERNAL MEDICINE

## 2025-03-06 PROCEDURE — 2500000002 HC RX 250 W HCPCS SELF ADMINISTERED DRUGS (ALT 637 FOR MEDICARE OP, ALT 636 FOR OP/ED)

## 2025-03-06 PROCEDURE — 97116 GAIT TRAINING THERAPY: CPT | Mod: GP,CQ

## 2025-03-06 PROCEDURE — 1210000001 HC SEMI-PRIVATE ROOM DAILY

## 2025-03-06 PROCEDURE — 2500000004 HC RX 250 GENERAL PHARMACY W/ HCPCS (ALT 636 FOR OP/ED): Performed by: INTERNAL MEDICINE

## 2025-03-06 PROCEDURE — 2500000001 HC RX 250 WO HCPCS SELF ADMINISTERED DRUGS (ALT 637 FOR MEDICARE OP): Performed by: INTERNAL MEDICINE

## 2025-03-06 PROCEDURE — 94640 AIRWAY INHALATION TREATMENT: CPT

## 2025-03-06 PROCEDURE — 97530 THERAPEUTIC ACTIVITIES: CPT | Mod: GO,CO

## 2025-03-06 RX ADMIN — IPRATROPIUM BROMIDE AND ALBUTEROL SULFATE 3 ML: 2.5; .5 SOLUTION RESPIRATORY (INHALATION) at 11:16

## 2025-03-06 RX ADMIN — CEFTRIAXONE 2 G: 2 INJECTION, SOLUTION INTRAVENOUS at 08:55

## 2025-03-06 RX ADMIN — LISINOPRIL 40 MG: 20 TABLET ORAL at 08:56

## 2025-03-06 RX ADMIN — Medication 1000 MCG: at 08:56

## 2025-03-06 RX ADMIN — PANTOPRAZOLE SODIUM 40 MG: 40 TABLET, DELAYED RELEASE ORAL at 08:56

## 2025-03-06 RX ADMIN — SIMVASTATIN 40 MG: 20 TABLET, FILM COATED ORAL at 21:21

## 2025-03-06 RX ADMIN — OSELTAMAVIR PHOSPHATE 30 MG: 30 CAPSULE ORAL at 21:21

## 2025-03-06 RX ADMIN — OSELTAMAVIR PHOSPHATE 30 MG: 30 CAPSULE ORAL at 08:56

## 2025-03-06 RX ADMIN — HYDROCHLOROTHIAZIDE 12.5 MG: 12.5 CAPSULE ORAL at 08:57

## 2025-03-06 RX ADMIN — Medication 3 L/MIN: at 08:56

## 2025-03-06 RX ADMIN — ASPIRIN 81 MG CHEWABLE TABLET 81 MG: 81 TABLET CHEWABLE at 08:56

## 2025-03-06 RX ADMIN — IPRATROPIUM BROMIDE AND ALBUTEROL SULFATE 3 ML: 2.5; .5 SOLUTION RESPIRATORY (INHALATION) at 07:27

## 2025-03-06 RX ADMIN — IPRATROPIUM BROMIDE AND ALBUTEROL SULFATE 3 ML: 2.5; .5 SOLUTION RESPIRATORY (INHALATION) at 19:50

## 2025-03-06 ASSESSMENT — ENCOUNTER SYMPTOMS
DIAPHORESIS: 0
ABDOMINAL PAIN: 0
FATIGUE: 0
DIARRHEA: 0
BACK PAIN: 0
WOUND: 0
HEMATURIA: 0
SORE THROAT: 0
CHILLS: 0
FACIAL SWELLING: 0
AGITATION: 0
JOINT SWELLING: 0
CHOKING: 0
ACTIVITY CHANGE: 1
WHEEZING: 0
HALLUCINATIONS: 0
DYSURIA: 0
FEVER: 0
WEAKNESS: 1
VOMITING: 0
CONFUSION: 0
LIGHT-HEADEDNESS: 0
COUGH: 1
CHEST TIGHTNESS: 0
NERVOUS/ANXIOUS: 0
NECK PAIN: 0
SHORTNESS OF BREATH: 1
CONSTIPATION: 0
PALPITATIONS: 0
NAUSEA: 0

## 2025-03-06 ASSESSMENT — COGNITIVE AND FUNCTIONAL STATUS - GENERAL
HELP NEEDED FOR BATHING: A LOT
MOVING FROM LYING ON BACK TO SITTING ON SIDE OF FLAT BED WITH BEDRAILS: A LITTLE
WALKING IN HOSPITAL ROOM: A LOT
MOVING TO AND FROM BED TO CHAIR: A LITTLE
EATING MEALS: A LITTLE
TOILETING: A LOT
DRESSING REGULAR UPPER BODY CLOTHING: A LOT
MOBILITY SCORE: 15
STANDING UP FROM CHAIR USING ARMS: A LOT
CLIMB 3 TO 5 STEPS WITH RAILING: A LOT
DAILY ACTIVITIY SCORE: 14
PERSONAL GROOMING: A LITTLE
TURNING FROM BACK TO SIDE WHILE IN FLAT BAD: A LITTLE
DRESSING REGULAR LOWER BODY CLOTHING: A LOT

## 2025-03-06 ASSESSMENT — PAIN SCALES - GENERAL
PAINLEVEL_OUTOF10: 0 - NO PAIN

## 2025-03-06 ASSESSMENT — PAIN - FUNCTIONAL ASSESSMENT
PAIN_FUNCTIONAL_ASSESSMENT: 0-10
PAIN_FUNCTIONAL_ASSESSMENT: 0-10

## 2025-03-06 NOTE — ASSESSMENT & PLAN NOTE
Sepsis 2/2 Secondary pneumococcal pneumonia  -On admission, met SIRS criteria with evidence of pulmonary source  -Ceftriaxone 2 g daily  -Doxycycline 100 mg twice daily  3/5: GPC bacteremia.  May be strep.  Consult ID.  Continue current antibiotics.  3/6: Continue antibiotics as noted above.

## 2025-03-06 NOTE — PROGRESS NOTES
Jenifer Means is a 79 y.o. female on day 2 of admission presenting with Influenza A.    Review of Systems   Constitutional:  Positive for activity change. Negative for chills, diaphoresis, fatigue and fever.   HENT:  Negative for congestion, facial swelling, sneezing and sore throat.    Respiratory:  Positive for cough and shortness of breath. Negative for choking, chest tightness and wheezing.    Cardiovascular:  Negative for chest pain, palpitations and leg swelling.   Gastrointestinal:  Negative for abdominal pain, constipation, diarrhea, nausea and vomiting.   Genitourinary:  Negative for dysuria, hematuria and urgency.   Musculoskeletal:  Negative for back pain, gait problem, joint swelling and neck pain.   Skin:  Negative for rash and wound.   Neurological:  Positive for weakness. Negative for syncope and light-headedness.   Psychiatric/Behavioral:  Negative for agitation, confusion and hallucinations. The patient is not nervous/anxious.    All other systems reviewed and are negative.     Subjective   Jenifer Means is a 79 y.o.-year-old with hx of hypertension and suspected dementia presents with generalized weakness and cough.  Patient was in her normal state of health until about 2 weeks ago when her daughter started noticing that she was weaker than usual and has been coughing.  This is gotten progressively worse since then.  Today patient went to get out of bed and slid down onto the floor.  Did not actually fall.  Did not hit her head.  No LOC event.  Patient currently denies shortness of breath, fevers, or chills.  No lower extremity edema.  No sick contacts at home.  In the ED, needing 4 L O2 to keep sats greater than 90%.  Pulse 112.  WBC 13.0.  Found to be positive for influenza A.  CXR with evidence of right-sided infiltrate.  Started on antibiotics and Tamiflu and admitted to medicine.     3/4: Patient seen.  Remains on supplemental O2.  Feels fatigued, complains of myalgias.  Continue  treatment for influenza A.  No urinary symptoms.  Discussed with family in room.  Will need another 24 to 48 hours of treatment.  Reassess in AM.  3/5: Patient seen.  Down to 2 L O2 by nasal cannula.  Cough is less productive.  Still feels tired.  Weak voice.  Patient is growing GPC from 2 sets of blood cultures.  Patient remains on ceftriaxone and doxycycline.  Will place consult to ID.  Await results before considering 2D echo.  3/6: Patient seen.  Remains on 2 to 3 L O2 by NC.  Blood cultures identified as pneumococcal pneumonia.  Pansensitive.  Appreciate input from ID.  Doxycycline discontinued.  Continue ceftriaxone.  Likely require another 24 to 48 hours treatment.  Discussed with patient.       Objective     Last Recorded Vitals  /66 (BP Location: Right arm, Patient Position: Sitting)   Pulse 87   Temp 36.1 °C (97 °F) (Temporal)   Resp 19   Wt 79.5 kg (175 lb 4.3 oz)   SpO2 95%   Intake/Output last 3 Shifts:    Intake/Output Summary (Last 24 hours) at 3/6/2025 1006  Last data filed at 3/6/2025 0855  Gross per 24 hour   Intake 410 ml   Output 900 ml   Net -490 ml       Admission Weight  Weight: 78.9 kg (174 lb) (03/03/25 2041)    Daily Weight  03/04/25 : 79.5 kg (175 lb 4.3 oz)      Physical Exam  Constitutional:       General: She is not in acute distress.  HENT:      Head: Normocephalic and atraumatic.      Nose: Nose normal. No congestion or rhinorrhea.      Mouth/Throat:      Mouth: Mucous membranes are dry.      Pharynx: Oropharynx is clear.   Eyes:      General: No scleral icterus.     Extraocular Movements: Extraocular movements intact.      Pupils: Pupils are equal, round, and reactive to light.   Cardiovascular:      Rate and Rhythm: Normal rate and regular rhythm.      Heart sounds: Normal heart sounds. No murmur heard.     No friction rub. No gallop.   Pulmonary:      Effort: Pulmonary effort is normal.      Breath sounds: Normal breath sounds. No wheezing, rhonchi or rales.   Chest:       Chest wall: No tenderness.   Abdominal:      General: There is no distension.      Palpations: Abdomen is soft.      Tenderness: There is no abdominal tenderness. There is no guarding or rebound.   Musculoskeletal:         General: No swelling, tenderness or signs of injury. Normal range of motion.      Cervical back: Normal range of motion.   Skin:     General: Skin is warm and dry.      Coloration: Skin is not jaundiced.      Findings: No bruising, erythema or rash.   Neurological:      General: No focal deficit present.      Mental Status: She is alert and oriented to person, place, and time.          Lab Results  Results for orders placed or performed during the hospital encounter of 03/03/25 (from the past 24 hours)   Blood Culture    Specimen: Peripheral Venipuncture; Blood culture   Result Value Ref Range    Blood Culture Loaded on Instrument - Culture in progress    Blood Culture    Specimen: Peripheral Venipuncture; Blood culture   Result Value Ref Range    Blood Culture Loaded on Instrument - Culture in progress         Image Results  ECG 12 lead  Sinus tachycardia  Left atrial enlargement  Probable anteroseptal infarct, old    See ED provider note for full interpretation and clinical correlation  Confirmed by Sybil Billy ([id number]) on 3/4/2025 12:31:54 PM       Assessment/Plan     Assessment & Plan  Influenza A  Tamiflu 75 mg twice daily  Pneumonia of right middle lobe due to Streptococcus pneumoniae ([facility])  Sepsis 2/2 Secondary Bacterial Pneumonia  -On admission, met SIRS criteria with evidence of pulmonary source  -Ceftriaxone 2 g daily  -Doxycycline 100 mg twice daily  3/4: Blood cultures are negative at day 1.  No urinary antigens were sent.  Suspect pneumococcal pneumonia.  3/5: Blood cultures now growing GPC in pairs and chains.  Could be pneumococcal pneumonia.  Awaiting results.  Remains on ceftriaxone.  Consult ID.  Consider 2D echo if staph.  3/6: Identified as pneumococcal pneumonia.   Sensitive to all drugs tested.  Doxycycline has been discontinued per ID.  Appreciate ID recommendations.  Would likely require another 24 to 48 hours of antibiotic treatment.  Acute hypoxemic respiratory failure (Multi)  -On adm, needing 2 L O2 to keep sats greater than 90%  -Suspect secondary to influenza and bacterial pneumonia below, wean O2 as able  3/4: Wean O2 as able.  3/4: Remains on 2 L.  3/6: Currently on 3 L O2.  Seems to vacillate between 2 to 3 L.  Wean as able.  Sepsis (Multi)  Sepsis 2/2 Secondary pneumococcal pneumonia  -On admission, met SIRS criteria with evidence of pulmonary source  -Ceftriaxone 2 g daily  -Doxycycline 100 mg twice daily  3/5: GPC bacteremia.  May be strep.  Consult ID.  Continue current antibiotics.  3/6: Continue antibiotics as noted above.  Troponin level elevated  Type II MI: Suspect demand in setting of above, treatment per above.  Follow-up with PCP as outpatient.  Essential hypertension  Home meds  Hyperlipidemia  Home meds  Hypercholesterolemia  Home meds           Jones Espinosa MD

## 2025-03-06 NOTE — CARE PLAN
Problem: Safety - Adult  Goal: Free from fall injury  Outcome: Progressing     Problem: Discharge Planning  Goal: Discharge to home or other facility with appropriate resources  Outcome: Progressing     Problem: Chronic Conditions and Co-morbidities  Goal: Patient's chronic conditions and co-morbidity symptoms are monitored and maintained or improved  Outcome: Progressing     Problem: Nutrition  Goal: Nutrient intake appropriate for maintaining nutritional needs  Outcome: Progressing     Problem: Fall/Injury  Goal: Not fall by end of shift  Outcome: Progressing  Goal: Be free from injury by end of the shift  Outcome: Progressing  Goal: Verbalize understanding of personal risk factors for fall in the hospital  Outcome: Progressing  Goal: Verbalize understanding of risk factor reduction measures to prevent injury from fall in the home  Outcome: Progressing  Goal: Use assistive devices by end of the shift  Outcome: Progressing  Goal: Pace activities to prevent fatigue by end of the shift  Outcome: Progressing     Problem: Respiratory  Goal: Clear secretions with interventions this shift  Outcome: Progressing  Goal: Minimize anxiety/maximize coping throughout shift  Outcome: Progressing  Goal: Minimal/no exertional discomfort or dyspnea this shift  Outcome: Progressing  Goal: No signs of respiratory distress (eg. Use of accessory muscles. Peds grunting)  Outcome: Progressing  Goal: Patent airway maintained this shift  Outcome: Progressing  Goal: Tolerate mechanical ventilation evidenced by VS/agitation level this shift  Outcome: Progressing  Goal: Tolerate pulmonary toileting this shift  Outcome: Progressing  Goal: Verbalize decreased shortness of breath this shift  Outcome: Progressing  Goal: Wean oxygen to maintain O2 saturation per order/standard this shift  Outcome: Progressing  Goal: Increase self care and/or family involvement in next 24 hours  Outcome: Progressing     Problem: Skin  Goal: Decreased wound  size/increased tissue granulation at next dressing change  Outcome: Progressing  Flowsheets (Taken 3/6/2025 0907)  Decreased wound size/increased tissue granulation at next dressing change: Protective dressings over bony prominences  Goal: Participates in plan/prevention/treatment measures  Outcome: Progressing  Flowsheets (Taken 3/6/2025 0907)  Participates in plan/prevention/treatment measures:   Discuss with provider PT/OT consult   Elevate heels   Increase activity/out of bed for meals  Goal: Prevent/manage excess moisture  Outcome: Progressing  Flowsheets (Taken 3/6/2025 0907)  Prevent/manage excess moisture:   Monitor for/manage infection if present   Cleanse incontinence/protect with barrier cream   Moisturize dry skin  Goal: Prevent/minimize sheer/friction injuries  Outcome: Progressing  Flowsheets (Taken 3/6/2025 0907)  Prevent/minimize sheer/friction injuries:   Increase activity/out of bed for meals   HOB 30 degrees or less   Turn/reposition every 2 hours/use positioning/transfer devices  Goal: Promote/optimize nutrition  Outcome: Progressing  Goal: Promote skin healing  Outcome: Progressing     Problem: Pain  Goal: Takes deep breaths with improved pain control throughout the shift  Outcome: Progressing  Goal: Turns in bed with improved pain control throughout the shift  Outcome: Progressing  Goal: Walks with improved pain control throughout the shift  Outcome: Progressing  Goal: Performs ADL's with improved pain control throughout shift  Outcome: Progressing  Goal: Participates in PT with improved pain control throughout the shift  Outcome: Progressing  Goal: Free from opioid side effects throughout the shift  Outcome: Progressing  Goal: Free from acute confusion related to pain meds throughout the shift  Outcome: Progressing       The patient's goals for the shift include      The clinical goals for the shift include patient's O2 will remain >= 92% throughout the shift.

## 2025-03-06 NOTE — ASSESSMENT & PLAN NOTE
-On adm, needing 2 L O2 to keep sats greater than 90%  -Suspect secondary to influenza and bacterial pneumonia below, wean O2 as able  3/4: Wean O2 as able.  3/4: Remains on 2 L.  3/6: Currently on 3 L O2.  Seems to vacillate between 2 to 3 L.  Wean as able.

## 2025-03-06 NOTE — PROGRESS NOTES
Physical Therapy    Physical Therapy Treatment    Patient Name: Jenifer Means  MRN: [id number]  Department: [address]  Room: [address] [place] [address]  Today's Date: 3/6/2025  Time Calculation  Start Time: 0903  Stop Time: 0943  Time Calculation (min): 40 min         Assessment/Plan   PT Assessment  PT Assessment Results: Decreased strength, Decreased endurance  Rehab Prognosis: Good  Barriers to Discharge Home: Caregiver assistance, Physical needs  Evaluation/Treatment Tolerance: Patient limited by fatigue  End of Session Communication: Bedside nurse  Assessment Comment: pt on 02 and continuous pulse ox. pt required to amb to restroom. pt required cues on hand placement as pt did not control decent onto toilet. pt performed al hygiene care. cues on how to safely use FWW in restroom as pt was neglecting FWW. pt then returned to chair. cues on breathing. 02 stayed above 90% Sp02.  End of Session Patient Position: Up in chair, Alarm on     PT Plan  Treatment/Interventions: Transfer training, Gait training, Balance training, Strengthening, Endurance training, Therapeutic exercise, Therapeutic activity, Home exercise program  PT Plan: Ongoing PT  PT Frequency: 3 times per week  PT Discharge Recommendations: Moderate intensity level of continued care  Equipment Recommended upon Discharge: Wheeled walker (TBD - pt has no AD at home.)  PT Recommended Transfer Status: Assist x1  PT - OK to Discharge: Yes (To next level of care when medically cleared.)      General Visit Information:   PT  Visit  PT Received On: 03/06/25       Subjective   Precautions:               Objective   Pain:  Pain Assessment  0-10 (Numeric) Pain Score: 0 - No pain  Cognition:  Cognition  Orientation Level: Disoriented to time  Coordination:          Treatments:  Therapeutic Exercise  Therapeutic Exercise Performed:  (LAQ, hip flexion, ankle pumps, adducton with pillow x 12 ea)         Ambulation/Gait Training 1  Device 1: Rolling walker  Assistance 1: Minimum  assistance  Quality of Gait 1: Decreased step length, Diminished heel strike, Inconsistent stride length  Comments/Distance (ft) 1: 15ft x2  Transfer 1  Technique 1: Sit to stand, Stand to sit  Transfer Device 1: Walker  Transfer Level of Assistance 1: Minimum assistance    Outcome Measures:  [facility] Basic Mobility  Turning from your back to your side while in a flat bed without using bedrails: A little  Moving from lying on your back to sitting on the side of a flat bed without using bedrails: A little  Moving to and from bed to chair (including a wheelchair): A little  Standing up from a chair using your arms (e.g. wheelchair or bedside chair): A lot  To walk in hospital room: A lot  Climbing 3-5 steps with railing: A lot  Basic Mobility - Total Score: 15    Education Documentation  Mobility Training, taught by Sushma Fregoso PTA at 3/6/2025  1:31 PM.  Learner: Patient  Readiness: Acceptance  Method: Explanation  Response: Verbalizes Understanding    Education Comments  No comments found.        OP EDUCATION:       Encounter Problems       Encounter Problems (Active)       To improve strength, endurance, and balance:        Patient will participate in B LE exercise x 15 repetitions with PSO2 and HR WFL.  (Progressing)       Start:  03/04/25    Expected End:  03/18/25            Patient will complete transfers with CGA.  (Progressing)       Start:  03/04/25    Expected End:  03/18/25            Patient will ambulate 30 feet x 2 with WW and CGA with PSO2 and HR WFL.  (Progressing)       Start:  03/04/25    Expected End:  03/18/25

## 2025-03-06 NOTE — ASSESSMENT & PLAN NOTE
Sepsis 2/2 Secondary Bacterial Pneumonia  -On admission, met SIRS criteria with evidence of pulmonary source  -Ceftriaxone 2 g daily  -Doxycycline 100 mg twice daily  3/4: Blood cultures are negative at day 1.  No urinary antigens were sent.  Suspect pneumococcal pneumonia.  3/5: Blood cultures now growing GPC in pairs and chains.  Could be pneumococcal pneumonia.  Awaiting results.  Remains on ceftriaxone.  Consult ID.  Consider 2D echo if staph.  3/6: Identified as pneumococcal pneumonia.  Sensitive to all drugs tested.  Doxycycline has been discontinued per ID.  Appreciate ID recommendations.  Would likely require another 24 to 48 hours of antibiotic treatment.

## 2025-03-06 NOTE — PROGRESS NOTES
Occupational Therapy    OT Treatment    Patient Name: Jenifer Means  MRN: [id number]  Department: [address]  Room: [address] [place] [address]-  Today's Date: 3/6/2025  Time Calculation  Start Time: 1615  Stop Time: 1630  Time Calculation (min): 15 min        Assessment:  End of Session Patient Position: Up in chair, Alarm on     Plan:  Treatment Interventions: ADL retraining, Functional transfer training, UE strengthening/ROM, Endurance training, Cognitive reorientation, Patient/family training, Equipment evaluation/education, Neuromuscular reeducation  OT Frequency: 3 times per week  OT Discharge Recommendations: Moderate intensity level of continued care  OT - OK to Discharge: Yes  Treatment Interventions: ADL retraining, Functional transfer training, UE strengthening/ROM, Endurance training, Cognitive reorientation, Patient/family training, Equipment evaluation/education, Neuromuscular reeducation    Subjective   Previous Visit Info:  OT Last Visit  OT Received On: 03/06/25  General:  General  Prior to Session Communication: Bedside nurse  Patient Position Received: Up in chair, Alarm on  Precautions:  Medical Precautions: Fall precautions, Infection precautions, Oxygen therapy device and L/min (FLU)      Vital Signs Comment: SPO2 86-92% throughout session.     Pain:  Pain Assessment  Pain Assessment: 0-10  0-10 (Numeric) Pain Score: 0 - No pain    Objective    Cognition:  Cognition  Overall Cognitive Status: Impaired at baseline  Orientation Level: Disoriented to situation, Disoriented to time    Functional Standing Tolerance:  Time: 3 minutes with CGA and walker  Bed Mobility/Transfers: Transfers  Transfer: Yes  Transfer 1  Technique 1: Sit to stand, Stand to sit  Transfer Device 1: Walker  Transfer Level of Assistance 1: Contact guard  Trials/Comments 1: Patient tolerated x2 minutes and x3 minutes standing with walker and CGA.    Standing Balance:  Static Standing Balance  Static Standing-Balance Support: Bilateral upper  extremity supported  Static Standing-Level of Assistance: Contact guard    Outcome Measures:[facility] Daily Activity  Putting on and taking off regular lower body clothing: A lot  Bathing (including washing, rinsing, drying): A lot  Putting on and taking off regular upper body clothing: A lot  Toileting, which includes using toilet, bedpan or urinal: A lot  Taking care of personal grooming such as brushing teeth: A little  Eating Meals: A little  Daily Activity - Total Score: 14    Education Documentation  Body Mechanics, taught by HARMONY Alcantara at 3/6/2025  4:48 PM.  Learner: Patient  Readiness: Acceptance  Method: Explanation  Response: Needs Reinforcement    Education Comments  No comments found.           Goals:  Encounter Problems       Encounter Problems (Active)       ADLs       Patient with complete lower body dressing with modified independent level of assistance donning and doffing all LE clothes  with PRN adaptive equipment while supported sitting and standing (Progressing)       Start:  03/04/25    Expected End:  03/18/25            Patient will complete toileting including hygiene clothing management/hygiene with modified independent level of assistance and raised toilet seat and grab bars. (Progressing)       Start:  03/04/25    Expected End:  03/18/25               TRANSFERS       Patient will complete functional transfers with least restrictive device with supervision level of assistance. (Progressing)       Start:  03/04/25    Expected End:  03/18/25

## 2025-03-06 NOTE — ASSESSMENT & PLAN NOTE
Type II MI: Suspect demand in setting of above, treatment per above.  Follow-up with PCP as outpatient.

## 2025-03-06 NOTE — CARE PLAN
The patient's goals for the shift include      The clinical goals for the shift include pt remain free from falls and injuries, vitals remain stable, control pain, participate in pressure redistribution, spo2 > 90%    Over the shift, the patient did not make progress toward the following goals. Barriers to progression include . Recommendations to address these barriers include .

## 2025-03-07 ENCOUNTER — APPOINTMENT (OUTPATIENT)
Dept: RADIOLOGY | Facility: HOSPITAL | Age: 80
DRG: 871 | End: 2025-03-07
Payer: MEDICARE

## 2025-03-07 PROBLEM — E87.6 HYPOKALEMIA: Status: ACTIVE | Noted: 2025-03-07

## 2025-03-07 LAB
ANION GAP SERPL CALC-SCNC: 8 MMOL/L (ref 10–20)
BACTERIA BLD AEROBE CULT: ABNORMAL
BACTERIA BLD CULT: ABNORMAL
BUN SERPL-MCNC: 13 MG/DL (ref 6–23)
CALCIUM SERPL-MCNC: 8.6 MG/DL (ref 8.6–10.3)
CHLORIDE SERPL-SCNC: 95 MMOL/L (ref 98–107)
CO2 SERPL-SCNC: 35 MMOL/L (ref 21–32)
CREAT SERPL-MCNC: 0.78 MG/DL (ref 0.5–1.05)
EGFRCR SERPLBLD CKD-EPI 2021: 78 ML/MIN/1.73M*2
ERYTHROCYTE [DISTWIDTH] IN BLOOD BY AUTOMATED COUNT: 14.4 % (ref 11.5–14.5)
GLUCOSE SERPL-MCNC: 98 MG/DL (ref 74–99)
GRAM STN SPEC: ABNORMAL
GRAM STN SPEC: ABNORMAL
HCT VFR BLD AUTO: 32.2 % (ref 36–46)
HGB BLD-MCNC: 10.3 G/DL (ref 12–16)
MCH RBC QN AUTO: 26.7 PG (ref 26–34)
MCHC RBC AUTO-ENTMCNC: 32 G/DL (ref 32–36)
MCV RBC AUTO: 83 FL (ref 80–100)
NRBC BLD-RTO: 0 /100 WBCS (ref 0–0)
PLATELET # BLD AUTO: 453 X10*3/UL (ref 150–450)
POTASSIUM SERPL-SCNC: 3 MMOL/L (ref 3.5–5.3)
RBC # BLD AUTO: 3.86 X10*6/UL (ref 4–5.2)
SODIUM SERPL-SCNC: 135 MMOL/L (ref 136–145)
WBC # BLD AUTO: 9.3 X10*3/UL (ref 4.4–11.3)

## 2025-03-07 PROCEDURE — 94640 AIRWAY INHALATION TREATMENT: CPT

## 2025-03-07 PROCEDURE — 97116 GAIT TRAINING THERAPY: CPT | Mod: CQ,GP

## 2025-03-07 PROCEDURE — 85027 COMPLETE CBC AUTOMATED: CPT | Performed by: INTERNAL MEDICINE

## 2025-03-07 PROCEDURE — 71045 X-RAY EXAM CHEST 1 VIEW: CPT | Performed by: RADIOLOGY

## 2025-03-07 PROCEDURE — 80048 BASIC METABOLIC PNL TOTAL CA: CPT | Performed by: INTERNAL MEDICINE

## 2025-03-07 PROCEDURE — 2500000001 HC RX 250 WO HCPCS SELF ADMINISTERED DRUGS (ALT 637 FOR MEDICARE OP): Performed by: INTERNAL MEDICINE

## 2025-03-07 PROCEDURE — 2500000002 HC RX 250 W HCPCS SELF ADMINISTERED DRUGS (ALT 637 FOR MEDICARE OP, ALT 636 FOR OP/ED)

## 2025-03-07 PROCEDURE — 2500000005 HC RX 250 GENERAL PHARMACY W/O HCPCS: Performed by: INTERNAL MEDICINE

## 2025-03-07 PROCEDURE — 1210000001 HC SEMI-PRIVATE ROOM DAILY

## 2025-03-07 PROCEDURE — 99233 SBSQ HOSP IP/OBS HIGH 50: CPT | Performed by: INTERNAL MEDICINE

## 2025-03-07 PROCEDURE — 2500000002 HC RX 250 W HCPCS SELF ADMINISTERED DRUGS (ALT 637 FOR MEDICARE OP, ALT 636 FOR OP/ED): Performed by: INTERNAL MEDICINE

## 2025-03-07 PROCEDURE — 71275 CT ANGIOGRAPHY CHEST: CPT | Performed by: STUDENT IN AN ORGANIZED HEALTH CARE EDUCATION/TRAINING PROGRAM

## 2025-03-07 PROCEDURE — 2550000001 HC RX 255 CONTRASTS: Performed by: INTERNAL MEDICINE

## 2025-03-07 PROCEDURE — 2500000004 HC RX 250 GENERAL PHARMACY W/ HCPCS (ALT 636 FOR OP/ED): Performed by: INTERNAL MEDICINE

## 2025-03-07 PROCEDURE — 71045 X-RAY EXAM CHEST 1 VIEW: CPT

## 2025-03-07 PROCEDURE — 36415 COLL VENOUS BLD VENIPUNCTURE: CPT | Performed by: INTERNAL MEDICINE

## 2025-03-07 PROCEDURE — 71275 CT ANGIOGRAPHY CHEST: CPT

## 2025-03-07 RX ORDER — POTASSIUM CHLORIDE 20 MEQ/1
40 TABLET, EXTENDED RELEASE ORAL ONCE
Status: COMPLETED | OUTPATIENT
Start: 2025-03-07 | End: 2025-03-07

## 2025-03-07 RX ORDER — ENOXAPARIN SODIUM 100 MG/ML
40 INJECTION SUBCUTANEOUS EVERY 24 HOURS
Status: DISCONTINUED | OUTPATIENT
Start: 2025-03-07 | End: 2025-03-09 | Stop reason: HOSPADM

## 2025-03-07 RX ORDER — OSELTAMIVIR PHOSPHATE 75 MG/1
75 CAPSULE ORAL 2 TIMES DAILY
Status: COMPLETED | OUTPATIENT
Start: 2025-03-07 | End: 2025-03-08

## 2025-03-07 RX ADMIN — PANTOPRAZOLE SODIUM 40 MG: 40 TABLET, DELAYED RELEASE ORAL at 08:33

## 2025-03-07 RX ADMIN — Medication 5 L/MIN: at 08:33

## 2025-03-07 RX ADMIN — ENOXAPARIN SODIUM 40 MG: 100 INJECTION SUBCUTANEOUS at 16:00

## 2025-03-07 RX ADMIN — HYDROCHLOROTHIAZIDE 12.5 MG: 12.5 CAPSULE ORAL at 09:33

## 2025-03-07 RX ADMIN — SIMVASTATIN 40 MG: 20 TABLET, FILM COATED ORAL at 21:54

## 2025-03-07 RX ADMIN — ASPIRIN 81 MG CHEWABLE TABLET 81 MG: 81 TABLET CHEWABLE at 08:33

## 2025-03-07 RX ADMIN — IPRATROPIUM BROMIDE AND ALBUTEROL SULFATE 3 ML: 2.5; .5 SOLUTION RESPIRATORY (INHALATION) at 07:25

## 2025-03-07 RX ADMIN — POTASSIUM CHLORIDE 40 MEQ: 1500 TABLET, EXTENDED RELEASE ORAL at 09:33

## 2025-03-07 RX ADMIN — CEFTRIAXONE 2 G: 2 INJECTION, SOLUTION INTRAVENOUS at 08:34

## 2025-03-07 RX ADMIN — Medication 1000 MCG: at 08:33

## 2025-03-07 RX ADMIN — Medication 3 L/MIN: at 19:41

## 2025-03-07 RX ADMIN — Medication 2 L/MIN: at 07:25

## 2025-03-07 RX ADMIN — LISINOPRIL 40 MG: 20 TABLET ORAL at 08:33

## 2025-03-07 RX ADMIN — OSELTAMAVIR PHOSPHATE 75 MG: 75 CAPSULE ORAL at 21:54

## 2025-03-07 RX ADMIN — IPRATROPIUM BROMIDE AND ALBUTEROL SULFATE 3 ML: 2.5; .5 SOLUTION RESPIRATORY (INHALATION) at 19:41

## 2025-03-07 RX ADMIN — OSELTAMAVIR PHOSPHATE 30 MG: 30 CAPSULE ORAL at 08:33

## 2025-03-07 RX ADMIN — ACETAMINOPHEN 650 MG: 325 TABLET ORAL at 16:01

## 2025-03-07 RX ADMIN — IOHEXOL 75 ML: 350 INJECTION, SOLUTION INTRAVENOUS at 17:52

## 2025-03-07 ASSESSMENT — ENCOUNTER SYMPTOMS
HALLUCINATIONS: 0
CONFUSION: 0
CHEST TIGHTNESS: 0
WOUND: 0
ACTIVITY CHANGE: 1
VOMITING: 0
NAUSEA: 0
CONSTIPATION: 0
DIARRHEA: 0
FEVER: 0
NERVOUS/ANXIOUS: 0
DYSURIA: 0
COUGH: 1
BACK PAIN: 0
JOINT SWELLING: 0
ABDOMINAL PAIN: 0
SORE THROAT: 0
SHORTNESS OF BREATH: 1
AGITATION: 0
FATIGUE: 0
HEMATURIA: 0
FACIAL SWELLING: 0
CHILLS: 0
DIAPHORESIS: 0
CHOKING: 0
NECK PAIN: 0
PALPITATIONS: 0
LIGHT-HEADEDNESS: 0
WEAKNESS: 1
WHEEZING: 0

## 2025-03-07 ASSESSMENT — COGNITIVE AND FUNCTIONAL STATUS - GENERAL
STANDING UP FROM CHAIR USING ARMS: A LITTLE
MOBILITY SCORE: 20
TURNING FROM BACK TO SIDE WHILE IN FLAT BAD: A LITTLE
PERSONAL GROOMING: A LITTLE
PERSONAL GROOMING: A LITTLE
TOILETING: A LITTLE
DAILY ACTIVITIY SCORE: 18
HELP NEEDED FOR BATHING: A LITTLE
WALKING IN HOSPITAL ROOM: A LITTLE
DRESSING REGULAR UPPER BODY CLOTHING: A LITTLE
HELP NEEDED FOR BATHING: A LITTLE
MOVING TO AND FROM BED TO CHAIR: A LITTLE
DRESSING REGULAR LOWER BODY CLOTHING: A LOT
DRESSING REGULAR LOWER BODY CLOTHING: A LOT
MOVING TO AND FROM BED TO CHAIR: A LITTLE
CLIMB 3 TO 5 STEPS WITH RAILING: A LOT
DRESSING REGULAR UPPER BODY CLOTHING: A LITTLE
MOBILITY SCORE: 20
MOVING FROM LYING ON BACK TO SITTING ON SIDE OF FLAT BED WITH BEDRAILS: A LITTLE
WALKING IN HOSPITAL ROOM: A LOT
TOILETING: A LITTLE
DAILY ACTIVITIY SCORE: 18
CLIMB 3 TO 5 STEPS WITH RAILING: A LOT
WALKING IN HOSPITAL ROOM: A LITTLE
MOVING TO AND FROM BED TO CHAIR: A LITTLE
CLIMB 3 TO 5 STEPS WITH RAILING: A LOT
MOBILITY SCORE: 16

## 2025-03-07 ASSESSMENT — PAIN SCALES - GENERAL
PAINLEVEL_OUTOF10: 0 - NO PAIN
PAINLEVEL_OUTOF10: 3
PAINLEVEL_OUTOF10: 2
PAINLEVEL_OUTOF10: 0 - NO PAIN
PAINLEVEL_OUTOF10: 0 - NO PAIN

## 2025-03-07 ASSESSMENT — PAIN DESCRIPTION - LOCATION: LOCATION: HEAD

## 2025-03-07 ASSESSMENT — PAIN - FUNCTIONAL ASSESSMENT
PAIN_FUNCTIONAL_ASSESSMENT: 0-10

## 2025-03-07 NOTE — ASSESSMENT & PLAN NOTE
Sepsis 2/2 Secondary Bacterial Pneumonia  -On admission, met SIRS criteria with evidence of pulmonary source  -Ceftriaxone 2 g daily  -Doxycycline 100 mg twice daily  3/4: Blood cultures are negative at day 1.  No urinary antigens were sent.  Suspect pneumococcal pneumonia.  3/5: Blood cultures now growing GPC in pairs and chains.  Could be pneumococcal pneumonia.  Awaiting results.  Remains on ceftriaxone.  Consult ID.  Consider 2D echo if staph.  3/6: Identified as pneumococcal pneumonia.  Sensitive to all drugs tested.  Doxycycline has been discontinued per ID.  Appreciate ID recommendations.  Would likely require another 24 to 48 hours of antibiotic treatment.  3/7: Repeat cultures are negative at day 1.  Continue ceftriaxone.  Suspect okay for oral medications on discharge.  Await input from ID.

## 2025-03-07 NOTE — ASSESSMENT & PLAN NOTE
-On adm, needing 2 L O2 to keep sats greater than 90%  -Suspect secondary to influenza and bacterial pneumonia below, wean O2 as able  3/4: Wean O2 as able.  3/4: Remains on 2 L.  3/6: Currently on 3 L O2.  Seems to vacillate between 2 to 3 L.  Wean as able.  3/7: Remains on 3 L.  Noted to desaturate with activity.  Suspect may need O2 on discharge.

## 2025-03-07 NOTE — PROGRESS NOTES
Physical Therapy  Physical Therapy Treatment    Patient Name: Jenifer Means  MRN: [id number]  Department: [address]  Room: [address] [place] [address]  Today's Date: 3/7/2025  Time Calculation  Start Time: 0940  Stop Time: 1003  Time Calculation (min): 23 min    PT Plan  Treatment/Interventions: Transfer training, Gait training, Balance training, Strengthening, Endurance training, Therapeutic exercise, Therapeutic activity, Home exercise program  PT Plan: Ongoing PT  PT Frequency: 3 times per week  PT Discharge Recommendations: Moderate intensity level of continued care  Equipment Recommended upon Discharge: Wheeled walker (TBD - pt has no AD at home.)  PT Recommended Transfer Status: Assist x1  PT - OK to Discharge: Yes (To next level of care when medically cleared.)    General Visit Information:   PT  Visit  PT Received On: 03/07/25  Response to Previous Treatment: Patient with no complaints from previous session.    Reason for Referral:   Influenza A, PNA    Precautions:  Falls    O2    Pain:  No pain    Cognition:  Oriented X4    Activity Tolerance:  Activity Tolerance  Endurance: Tolerates 10 - 20 min exercise with multiple rests    Treatments:  Bed Mobility  Supine to sitting: Close supervision  Sitting to supine: Close supervision  Scooting: Close supervision    Transfers  Sit to stand: Contact guard  Stand to sit: Contact guard  Commode: Contact guard    Ambulation/Gait Training  15 feet x2 reps bed<>bathroom with FWW, Julia        Pt RTB following tx. Alarm on and call light in reach.      Outcome Measures:  [facility] Basic Mobility  Turning from your back to your side while in a flat bed without using bedrails: A little  Moving from lying on your back to sitting on the side of a flat bed without using bedrails: A little  Moving to and from bed to chair (including a wheelchair): A little  Standing up from a chair using your arms (e.g. wheelchair or bedside chair): A little  To walk in hospital room: A lot  Climbing 3-5 steps  with railing: A lot  Basic Mobility - Total Score: 16    Education Documentation  Mobility Training, taught by Migue Holloway PTA at 3/7/2025  2:03 PM.  Learner: Patient  Readiness: Acceptance  Method: Explanation, Demonstration  Response: Verbalizes Understanding    Education Comments  No comments found.        OP EDUCATION:       Encounter Problems       Encounter Problems (Active)       To improve strength, endurance, and balance:        Patient will participate in B LE exercise x 15 repetitions with PSO2 and HR WFL.  (Progressing)       Start:  03/04/25    Expected End:  03/18/25            Patient will complete transfers with CGA.  (Progressing)       Start:  03/04/25    Expected End:  03/18/25            Patient will ambulate 30 feet x 2 with WW and CGA with PSO2 and HR WFL.  (Progressing)       Start:  03/04/25    Expected End:  03/18/25

## 2025-03-07 NOTE — PROGRESS NOTES
Jenifer Means is a 79 y.o. female on day 3 of admission presenting with Pneumonia of right middle lobe due to Streptococcus pneumoniae (CMS-HCC).    Review of Systems   Constitutional:  Positive for activity change. Negative for chills, diaphoresis, fatigue and fever.   HENT:  Negative for congestion, facial swelling, sneezing and sore throat.    Respiratory:  Positive for cough and shortness of breath. Negative for choking, chest tightness and wheezing.    Cardiovascular:  Negative for chest pain, palpitations and leg swelling.   Gastrointestinal:  Negative for abdominal pain, constipation, diarrhea, nausea and vomiting.   Genitourinary:  Negative for dysuria, hematuria and urgency.   Musculoskeletal:  Negative for back pain, gait problem, joint swelling and neck pain.   Skin:  Negative for rash and wound.   Neurological:  Positive for weakness. Negative for syncope and light-headedness.   Psychiatric/Behavioral:  Negative for agitation, confusion and hallucinations. The patient is not nervous/anxious.    All other systems reviewed and are negative.     Subjective   Jenifer Means is a 79 y.o.-year-old with hx of hypertension and suspected dementia presents with generalized weakness and cough.  Patient was in her normal state of health until about 2 weeks ago when her daughter started noticing that she was weaker than usual and has been coughing.  This is gotten progressively worse since then.  Today patient went to get out of bed and slid down onto the floor.  Did not actually fall.  Did not hit her head.  No LOC event.  Patient currently denies shortness of breath, fevers, or chills.  No lower extremity edema.  No sick contacts at home.  In the ED, needing 4 L O2 to keep sats greater than 90%.  Pulse 112.  WBC 13.0.  Found to be positive for influenza A.  CXR with evidence of right-sided infiltrate.  Started on antibiotics and Tamiflu and admitted to medicine.     3/4: Patient seen.  Remains on  supplemental O2.  Feels fatigued, complains of myalgias.  Continue treatment for influenza A.  No urinary symptoms.  Discussed with family in room.  Will need another 24 to 48 hours of treatment.  Reassess in AM.  3/5: Patient seen.  Down to 2 L O2 by nasal cannula.  Cough is less productive.  Still feels tired.  Weak voice.  Patient is growing GPC from 2 sets of blood cultures.  Patient remains on ceftriaxone and doxycycline.  Will place consult to ID.  Await results before considering 2D echo.  3/6: Patient seen.  Remains on 2 to 3 L O2 by NC.  Blood cultures identified as pneumococcal pneumonia.  Pansensitive.  Appreciate input from ID.  Doxycycline discontinued.  Continue ceftriaxone.  Likely require another 24 to 48 hours treatment.  Discussed with patient.  3/7: Patient seen.  Remains on 3 L O2 by NC, nursing notes she needs more when she is up and active.  Has been able to move around the room somewhat independently.  Begin discharge planning.  Repeat cultures are negative at day 1.  Suspect may need home O2 on discharge and oral antibiotics.  Appreciate input from ID.       Objective     Last Recorded Vitals  /72 (BP Location: Right arm, Patient Position: Lying)   Pulse 96   Temp 36.5 °C (97.7 °F) (Temporal)   Resp 17   Wt 79.5 kg (175 lb 4.3 oz)   SpO2 97%   Intake/Output last 3 Shifts:    Intake/Output Summary (Last 24 hours) at 3/7/2025 1108  Last data filed at 3/7/2025 0834  Gross per 24 hour   Intake 50 ml   Output --   Net 50 ml       Admission Weight  Weight: 78.9 kg (174 lb) (03/03/25 2041)    Daily Weight  03/04/25 : 79.5 kg (175 lb 4.3 oz)      Physical Exam  Constitutional:       General: She is not in acute distress.  HENT:      Head: Normocephalic and atraumatic.      Nose: Nose normal. No congestion or rhinorrhea.      Mouth/Throat:      Mouth: Mucous membranes are dry.      Pharynx: Oropharynx is clear.   Eyes:      General: No scleral icterus.     Extraocular Movements: Extraocular  movements intact.      Pupils: Pupils are equal, round, and reactive to light.   Cardiovascular:      Rate and Rhythm: Normal rate and regular rhythm.      Heart sounds: Normal heart sounds. No murmur heard.     No friction rub. No gallop.   Pulmonary:      Effort: Pulmonary effort is normal.      Breath sounds: Normal breath sounds. No wheezing, rhonchi or rales.   Chest:      Chest wall: No tenderness.   Abdominal:      General: There is no distension.      Palpations: Abdomen is soft.      Tenderness: There is no abdominal tenderness. There is no guarding or rebound.   Musculoskeletal:         General: No swelling, tenderness or signs of injury. Normal range of motion.      Cervical back: Normal range of motion.   Skin:     General: Skin is warm and dry.      Coloration: Skin is not jaundiced.      Findings: No bruising, erythema or rash.   Neurological:      General: No focal deficit present.      Mental Status: She is alert and oriented to person, place, and time.          Lab Results  Results for orders placed or performed during the hospital encounter of 03/03/25 (from the past 24 hours)   CBC   Result Value Ref Range    WBC 9.3 4.4 - 11.3 x10*3/uL    nRBC 0.0 0.0 - 0.0 /100 WBCs    RBC 3.86 (L) 4.00 - 5.20 x10*6/uL    Hemoglobin 10.3 (L) 12.0 - 16.0 g/dL    Hematocrit 32.2 (L) 36.0 - 46.0 %    MCV 83 80 - 100 fL    MCH 26.7 26.0 - 34.0 pg    MCHC 32.0 32.0 - 36.0 g/dL    RDW 14.4 11.5 - 14.5 %    Platelets 453 (H) 150 - 450 x10*3/uL   Basic Metabolic Panel   Result Value Ref Range    Glucose 98 74 - 99 mg/dL    Sodium 135 (L) 136 - 145 mmol/L    Potassium 3.0 (L) 3.5 - 5.3 mmol/L    Chloride 95 (L) 98 - 107 mmol/L    Bicarbonate 35 (H) 21 - 32 mmol/L    Anion Gap 8 (L) 10 - 20 mmol/L    Urea Nitrogen 13 6 - 23 mg/dL    Creatinine 0.78 0.50 - 1.05 mg/dL    eGFR 78 >60 mL/min/1.73m*2    Calcium 8.6 8.6 - 10.3 mg/dL        Image Results  ECG 12 lead  Sinus tachycardia  Left atrial enlargement  Probable  anteroseptal infarct, old    See ED provider note for full interpretation and clinical correlation  Confirmed by Sybil Billy ([id number]) on 3/4/2025 12:31:54 PM       Assessment/Plan     Assessment & Plan  Influenza A  Tamiflu 75 mg twice daily  Pneumonia of right middle lobe due to Streptococcus pneumoniae (CMS-[facility])  Sepsis 2/2 Secondary Bacterial Pneumonia  -On admission, met SIRS criteria with evidence of pulmonary source  -Ceftriaxone 2 g daily  -Doxycycline 100 mg twice daily  3/4: Blood cultures are negative at day 1.  No urinary antigens were sent.  Suspect pneumococcal pneumonia.  3/5: Blood cultures now growing GPC in pairs and chains.  Could be pneumococcal pneumonia.  Awaiting results.  Remains on ceftriaxone.  Consult ID.  Consider 2D echo if staph.  3/6: Identified as pneumococcal pneumonia.  Sensitive to all drugs tested.  Doxycycline has been discontinued per ID.  Appreciate ID recommendations.  Would likely require another 24 to 48 hours of antibiotic treatment.  3/7: Repeat cultures are negative at day 1.  Continue ceftriaxone.  Suspect okay for oral medications on discharge.  Await input from ID.  Acute hypoxemic respiratory failure (Multi)  -On adm, needing 2 L O2 to keep sats greater than 90%  -Suspect secondary to influenza and bacterial pneumonia below, wean O2 as able  3/4: Wean O2 as able.  3/4: Remains on 2 L.  3/6: Currently on 3 L O2.  Seems to vacillate between 2 to 3 L.  Wean as able.  3/7: Remains on 3 L.  Noted to desaturate with activity.  Suspect may need O2 on discharge.  Sepsis (Multi)  Sepsis 2/2 Secondary pneumococcal pneumonia  -On admission, met SIRS criteria with evidence of pulmonary source  -Ceftriaxone 2 g daily  -Doxycycline 100 mg twice daily  3/5: GPC bacteremia.  May be strep.  Consult ID.  Continue current antibiotics.  3/6: Continue antibiotics as noted above.  Troponin level elevated  Type II MI: Suspect demand in setting of above, treatment per above.  Follow-up  with PCP as outpatient.  Essential hypertension  Home meds  Hyperlipidemia  Home meds  Hypercholesterolemia  Home meds  Hypokalemia  Noted to be slightly low.  Replace and recheck.           Jones Espinosa MD

## 2025-03-07 NOTE — CARE PLAN
Problem: Safety - Adult  Goal: Free from fall injury  Outcome: Progressing     Problem: Discharge Planning  Goal: Discharge to home or other facility with appropriate resources  Outcome: Progressing     Problem: Chronic Conditions and Co-morbidities  Goal: Patient's chronic conditions and co-morbidity symptoms are monitored and maintained or improved  Outcome: Progressing     Problem: Nutrition  Goal: Nutrient intake appropriate for maintaining nutritional needs  Outcome: Progressing     Problem: Fall/Injury  Goal: Not fall by end of shift  Outcome: Progressing  Goal: Be free from injury by end of the shift  Outcome: Progressing  Goal: Verbalize understanding of personal risk factors for fall in the hospital  Outcome: Progressing  Goal: Verbalize understanding of risk factor reduction measures to prevent injury from fall in the home  Outcome: Progressing  Goal: Use assistive devices by end of the shift  Outcome: Progressing  Goal: Pace activities to prevent fatigue by end of the shift  Outcome: Progressing     Problem: Respiratory  Goal: Clear secretions with interventions this shift  Outcome: Progressing  Goal: Minimize anxiety/maximize coping throughout shift  Outcome: Progressing  Goal: Minimal/no exertional discomfort or dyspnea this shift  Outcome: Progressing  Goal: No signs of respiratory distress (eg. Use of accessory muscles. Peds grunting)  Outcome: Progressing  Goal: Patent airway maintained this shift  Outcome: Progressing  Goal: Tolerate mechanical ventilation evidenced by VS/agitation level this shift  Outcome: Progressing  Goal: Tolerate pulmonary toileting this shift  Outcome: Progressing  Goal: Verbalize decreased shortness of breath this shift  Outcome: Progressing  Goal: Wean oxygen to maintain O2 saturation per order/standard this shift  Outcome: Progressing  Goal: Increase self care and/or family involvement in next 24 hours  Outcome: Progressing     Problem: Skin  Goal: Decreased wound  size/increased tissue granulation at next dressing change  Outcome: Progressing  Goal: Participates in plan/prevention/treatment measures  Outcome: Progressing  Goal: Prevent/manage excess moisture  Outcome: Progressing  Goal: Prevent/minimize sheer/friction injuries  Outcome: Progressing  Goal: Promote/optimize nutrition  Outcome: Progressing  Goal: Promote skin healing  Outcome: Progressing     Problem: Pain  Goal: Takes deep breaths with improved pain control throughout the shift  Outcome: Progressing  Goal: Turns in bed with improved pain control throughout the shift  Outcome: Progressing  Goal: Walks with improved pain control throughout the shift  Outcome: Progressing  Goal: Performs ADL's with improved pain control throughout shift  Outcome: Progressing  Goal: Participates in PT with improved pain control throughout the shift  Outcome: Progressing  Goal: Free from opioid side effects throughout the shift  Outcome: Progressing  Goal: Free from acute confusion related to pain meds throughout the shift  Outcome: Progressing       The patient's goals for the shift include      The clinical goals for the shift include patient's O2 will remain >= 92% throughout the shift.

## 2025-03-07 NOTE — PROGRESS NOTES
Pt reviewed in IDT rounds, EDOD is 24-48 hours. Plan is for Pt to discharge home w/  home health care. [facility] ([facility]) to follow.    03/07/25 0020   Discharge Planning   Expected Discharge Disposition Home H  ([facility])   Stroke Family Assessment   Stroke Family Assessment Needed No   Intensity of Service   Intensity of Service 0-30 min

## 2025-03-07 NOTE — PROGRESS NOTES
[facility] INFECTIOUS DISEASE PROGRESS NOTE    Patient Name: Jenifer Means  MRN: [id number]    INTERVAL HISTORY:   No fevers. Feels better. Remains on 2 to 3 L O2 by NC.     Patient Active Problem List   Diagnosis    Influenza A    Acute hypoxemic respiratory failure (Multi)    Pneumonia of right middle lobe due to Streptococcus pneumoniae (CMS-HCC)    Sepsis (Multi)    Troponin level elevated    Essential hypertension    Hyperlipidemia    Hypercholesterolemia    Low back pain    Diverticular disease of colon        ASSESSMENT:   Sepsis  CAP w strep pneumoniae  Influenza A  Acute hypoxic resp failure  HTN  HLD           Plan  Ceftriaxone  Tamiflu  Repeat Bcx x 2  Follow O2 levels  Rev MICs of the Bcx isolate. PSSP present    MEDICATIONS: reviewed.    Current Facility-Administered Medications:     acetaminophen (Tylenol) tablet 650 mg, 650 mg, oral, q4h PRN **OR** acetaminophen (Tylenol) oral liquid 650 mg, 650 mg, nasogastric tube, q4h PRN **OR** acetaminophen (Tylenol) suppository 650 mg, 650 mg, rectal, q4h PRN, Con Elkins MD    alum-mag hydroxide-simeth (Mylanta) 200-200-20 mg/5 mL oral suspension 30 mL, 30 mL, oral, q6h PRN, Con Elkins MD    aspirin chewable tablet 81 mg, 81 mg, oral, Daily, Jones Espinosa MD, 81 mg at 03/06/25 0856    benzocaine-menthol (Cepastat Sore Throat) lozenge 1 lozenge, 1 lozenge, Mouth/Throat, q2h PRN, Con Elkins MD    cefTRIAXone (Rocephin) 2 g in dextrose (iso) IV 50 mL, 2 g, intravenous, q24h, Con Elkins MD, Stopped at 03/06/25 0935    cyanocobalamin (Vitamin B-12) tablet 1,000 mcg, 1,000 mcg, oral, Daily, Jones Espinosa MD, 1,000 mcg at 03/06/25 0856    dextromethorphan-guaifenesin (Robitussin DM)  mg/5 mL oral liquid 5 mL, 5 mL, oral, q4h PRN, Con Elkins MD    guaiFENesin (Mucinex) 12 hr tablet 600 mg, 600 mg, oral, q12h PRN, Con Elkins MD    hydroCHLOROthiazide (Microzide) capsule 12.5 mg, 12.5 mg, oral, q24h, Jones Espinosa MD, 12.5  "mg at 25 0857    ipratropium-albuteroL (Duo-Neb) 0.5-2.5 mg/3 mL nebulizer solution 3 mL, 3 mL, nebulization, q4h PRN, Con Elkins MD    ipratropium-albuteroL (Duo-Neb) 0.5-2.5 mg/3 mL nebulizer solution 3 mL, 3 mL, nebulization, TID, Con Elkins MD, 3 mL at 25 1950    lisinopril tablet 40 mg, 40 mg, oral, Daily, Jones Espinosa MD, 40 mg at 25 0856    melatonin tablet 3 mg, 3 mg, oral, Nightly PRN, Con Elkins MD    ondansetron (Zofran) tablet 4 mg, 4 mg, oral, q8h PRN **OR** ondansetron (Zofran) injection 4 mg, 4 mg, intravenous, q8h PRN, Con Elkins MD    oseltamivir (Tamiflu) capsule 30 mg, 30 mg, oral, BID, Mary Verma, PharmD, 30 mg at 25    oxygen (O2) therapy, , inhalation, Continuous - Inhalation, Jones Espinosa MD, Last Rate: 180,000 mL/hr at 25, 3 L/min at 25    pantoprazole (ProtoNix) EC tablet 40 mg, 40 mg, oral, Daily, Jones Espinosa MD, 40 mg at 25    polyethylene glycol (Glycolax, Miralax) packet 17 g, 17 g, oral, BID PRN, Con Elkins MD    simvastatin (Zocor) tablet 40 mg, 40 mg, oral, Nightly, Jones Espinosa MD, 40 mg at 25     PHYSICAL EXAM:  Vital signs: /68 (BP Location: Left arm, Patient Position: Sitting)   Pulse 92   Temp 37.4 °C (99.4 °F) (Temporal)   Resp 15   Ht 1.651 m (5' 5\")   Wt 79.5 kg (175 lb 4.3 oz)   SpO2 93%   BMI 29.17 kg/m²   Temp (24hrs), Av.8 °C (98.2 °F), Min:36 °C (96.8 °F), Max:37.4 °C (99.4 °F)    General: alert, oriented, NAD  Lungs: bilaterally clear to auscultation  Heart: regular rate and rhythm  Abdomen: soft, non tender, non distended, BS+  Extremities: no edema  No rashes  No joint inflammation  Neck supple  Lines ok  No CVAT              Labs:    Results for orders placed or performed during the hospital encounter of 25 (from the past 96 hours)   ECG 12 lead   Result Value Ref Range    Ventricular Rate 103 BPM    Atrial Rate 103 BPM    DE Interval " 180 ms    QRS Duration 75 ms    QT Interval 343 ms    QTC Calculation(Bazett) 449 ms    P Axis 51 degrees    R Axis 43 degrees    T Axis 42 degrees    QRS Count 17 beats    Q Onset 249 ms    T Offset 421 ms    QTC Fredericia 411 ms   Sars-CoV-2 PCR   Result Value Ref Range    Coronavirus 2019, PCR Not Detected Not Detected   Influenza A, and B PCR   Result Value Ref Range    Flu A Result Detected (A) Not Detected    Flu B Result Not Detected Not Detected   CBC and Auto Differential   Result Value Ref Range    WBC 13.0 (H) 4.4 - 11.3 x10*3/uL    nRBC 0.0 0.0 - 0.0 /100 WBCs    RBC 4.58 4.00 - 5.20 x10*6/uL    Hemoglobin 12.1 12.0 - 16.0 g/dL    Hematocrit 37.2 36.0 - 46.0 %    MCV 81 80 - 100 fL    MCH 26.4 26.0 - 34.0 pg    MCHC 32.5 32.0 - 36.0 g/dL    RDW 13.8 11.5 - 14.5 %    Platelets 379 150 - 450 x10*3/uL    Neutrophils % 87.0 40.0 - 80.0 %    Immature Granulocytes %, Automated 3.1 (H) 0.0 - 0.9 %    Lymphocytes % 3.2 13.0 - 44.0 %    Monocytes % 5.8 2.0 - 10.0 %    Eosinophils % 0.0 0.0 - 6.0 %    Basophils % 0.9 0.0 - 2.0 %    Neutrophils Absolute 11.28 (H) 1.60 - 5.50 x10*3/uL    Immature Granulocytes Absolute, Automated 0.40 0.00 - 0.50 x10*3/uL    Lymphocytes Absolute 0.42 (L) 0.80 - 3.00 x10*3/uL    Monocytes Absolute 0.75 0.05 - 0.80 x10*3/uL    Eosinophils Absolute 0.00 0.00 - 0.40 x10*3/uL    Basophils Absolute 0.12 (H) 0.00 - 0.10 x10*3/uL   Comprehensive metabolic panel   Result Value Ref Range    Glucose 119 (H) 74 - 99 mg/dL    Sodium 132 (L) 136 - 145 mmol/L    Potassium 3.6 3.5 - 5.3 mmol/L    Chloride 91 (L) 98 - 107 mmol/L    Bicarbonate 33 (H) 21 - 32 mmol/L    Anion Gap 12 10 - 20 mmol/L    Urea Nitrogen 18 6 - 23 mg/dL    Creatinine 0.69 0.50 - 1.05 mg/dL    eGFR 90 >60 mL/min/1.73m*2    Calcium 9.1 8.6 - 10.3 mg/dL    Albumin 3.4 3.4 - 5.0 g/dL    Alkaline Phosphatase 87 33 - 136 U/L    Total Protein 7.2 6.4 - 8.2 g/dL    AST 24 9 - 39 U/L    Bilirubin, Total 0.9 0.0 - 1.2 mg/dL    ALT 23  7 - 45 U/L   Magnesium   Result Value Ref Range    Magnesium 1.97 1.60 - 2.40 mg/dL   Lipase   Result Value Ref Range    Lipase 10 9 - 82 U/L   B-Type Natriuretic Peptide   Result Value Ref Range     (H) 0 - 99 pg/mL   Blood Gas Venous Full Panel   Result Value Ref Range    POCT pH, Venous 7.44 (H) 7.33 - 7.43 pH    POCT pCO2, Venous 52 (H) 41 - 51 mm Hg    POCT pO2, Venous 25 (L) 35 - 45 mm Hg    POCT SO2, Venous 37 (L) 45 - 75 %    POCT Oxy Hemoglobin, Venous 35.8 (L) 45.0 - 75.0 %    POCT Hematocrit Calculated, Venous 38.0 36.0 - 46.0 %    POCT Sodium, Venous 129 (L) 136 - 145 mmol/L    POCT Potassium, Venous 3.6 3.5 - 5.3 mmol/L    POCT Chloride, Venous 91 (L) 98 - 107 mmol/L    POCT Ionized Calicum, Venous 1.16 1.10 - 1.33 mmol/L    POCT Glucose, Venous 126 (H) 74 - 99 mg/dL    POCT Lactate, Venous 1.8 0.4 - 2.0 mmol/L    POCT Base Excess, Venous 9.5 (H) -2.0 - 3.0 mmol/L    POCT HCO3 Calculated, Venous 35.3 (H) 22.0 - 26.0 mmol/L    POCT Hemoglobin, Venous 12.5 12.0 - 16.0 g/dL    POCT Anion Gap, Venous 6.0 (L) 10.0 - 25.0 mmol/L    Patient Temperature 37.0 degrees Celsius    FiO2 36 %   Troponin I, High Sensitivity, Initial   Result Value Ref Range    Troponin I, High Sensitivity 23 (H) 0 - 13 ng/L   Troponin, High Sensitivity, 1 Hour   Result Value Ref Range    Troponin I, High Sensitivity 21 (H) 0 - 13 ng/L   Lactate   Result Value Ref Range    Lactate 1.1 0.4 - 2.0 mmol/L   Blood Culture    Specimen: Peripheral Venipuncture; Blood culture   Result Value Ref Range    Blood Culture Streptococcus pneumoniae (A)     BLOOD CULTURE BOTTLE  Positive Aerobic and Anaerobic Bottle     Gram Stain Gram positive cocci, pairs and chains (AA)     Gram Stain Gram positive cocci, pairs and chains (AA)        Susceptibility    Streptococcus pneumoniae - DISK DIFFUSION     Clindamycin  Susceptible mm     Erythromycin  Susceptible mm     Tetracycline  Susceptible mm     Trimethoprim/Sulfamethoxazole  Susceptible mm      Vancomycin  Susceptible mm    Streptococcus pneumoniae - GRADIENT DIFFUSION     Penicillin (Oral)  Susceptible ug/ml     Penicillin (IV, Meningitis)  Susceptible ug/ml     Penicillin (IV, Nonmeningitis)  Susceptible ug/ml     Ceftriaxone (Meningitis)  Susceptible ug/ml     Ceftriaxone (Nonmeningitis)  Susceptible ug/ml   Blood Culture    Specimen: Peripheral Venipuncture; Blood culture   Result Value Ref Range    Blood Culture Streptococcus pneumoniae (A)     BLOOD CULTURE BOTTLE  Positive Aerobic Bottle     Gram Stain Gram positive cocci, pairs and chains (AA)    MRSA Surveillance for Vancomycin De-escalation, PCR    Specimen: Anterior Nares; Swab   Result Value Ref Range    MRSA PCR Not Detected Not Detected   CBC   Result Value Ref Range    WBC 14.2 (H) 4.4 - 11.3 x10*3/uL    nRBC 0.0 0.0 - 0.0 /100 WBCs    RBC 4.42 4.00 - 5.20 x10*6/uL    Hemoglobin 11.6 (L) 12.0 - 16.0 g/dL    Hematocrit 35.7 (L) 36.0 - 46.0 %    MCV 81 80 - 100 fL    MCH 26.2 26.0 - 34.0 pg    MCHC 32.5 32.0 - 36.0 g/dL    RDW 13.9 11.5 - 14.5 %    Platelets 383 150 - 450 x10*3/uL   Basic metabolic panel   Result Value Ref Range    Glucose 101 (H) 74 - 99 mg/dL    Sodium 131 (L) 136 - 145 mmol/L    Potassium 3.5 3.5 - 5.3 mmol/L    Chloride 91 (L) 98 - 107 mmol/L    Bicarbonate 30 21 - 32 mmol/L    Anion Gap 14 10 - 20 mmol/L    Urea Nitrogen 16 6 - 23 mg/dL    Creatinine 0.61 0.50 - 1.05 mg/dL    eGFR >90 >60 mL/min/1.73m*2    Calcium 8.9 8.6 - 10.3 mg/dL   Urinalysis with Reflex Culture and Microscopic   Result Value Ref Range    Color, Urine Yellow Light-Yellow, Yellow, Dark-Yellow    Appearance, Urine Turbid (N) Clear    Specific Gravity, Urine 1.036 (N) 1.005 - 1.035    pH, Urine 6.0 5.0, 5.5, 6.0, 6.5, 7.0, 7.5, 8.0    Protein, Urine 100 (2+) (A) NEGATIVE, 10 (TRACE), 20 (TRACE) mg/dL    Glucose, Urine 30 (TRACE) (A) Normal mg/dL    Blood, Urine 0.06 (1+) (A) NEGATIVE mg/dL    Ketones, Urine 20 (1+) (A) NEGATIVE mg/dL    Bilirubin, Urine  NEGATIVE NEGATIVE mg/dL    Urobilinogen, Urine Normal Normal mg/dL    Nitrite, Urine NEGATIVE NEGATIVE    Leukocyte Esterase, Urine 75 Dax/uL (A) NEGATIVE   Extra Urine Gray Tube   Result Value Ref Range    Extra Tube Hold for add-ons.    Microscopic Only, Urine   Result Value Ref Range    WBC, Urine 21-50 (A) 1-5, NONE /HPF    RBC, Urine 11-20 (A) NONE, 1-2, 3-5 /HPF    Squamous Epithelial Cells, Urine 1-9 (SPARSE) Reference range not established. /HPF    Bacteria, Urine 1+ (A) NONE SEEN /HPF    Mucus, Urine 2+ Reference range not established. /LPF    Hyaline Casts, Urine 1+ (A) NONE /LPF   Urine Culture    Specimen: Clean Catch/Voided; Urine   Result Value Ref Range    Urine Culture       Clinically insignificant growth based on current clinical standards.   Basic Metabolic Panel   Result Value Ref Range    Glucose 90 74 - 99 mg/dL    Sodium 138 136 - 145 mmol/L    Potassium 4.1 3.5 - 5.3 mmol/L    Chloride 94 (L) 98 - 107 mmol/L    Bicarbonate 33 (H) 21 - 32 mmol/L    Anion Gap 15 10 - 20 mmol/L    Urea Nitrogen 16 6 - 23 mg/dL    Creatinine 0.84 0.50 - 1.05 mg/dL    eGFR 72 >60 mL/min/1.73m*2    Calcium 9.2 8.6 - 10.3 mg/dL   CBC   Result Value Ref Range    WBC 14.2 (H) 4.4 - 11.3 x10*3/uL    nRBC 0.0 0.0 - 0.0 /100 WBCs    RBC 4.28 4.00 - 5.20 x10*6/uL    Hemoglobin 11.1 (L) 12.0 - 16.0 g/dL    Hematocrit 35.3 (L) 36.0 - 46.0 %    MCV 83 80 - 100 fL    MCH 25.9 (L) 26.0 - 34.0 pg    MCHC 31.4 (L) 32.0 - 36.0 g/dL    RDW 14.1 11.5 - 14.5 %    Platelets 426 150 - 450 x10*3/uL   Blood Culture    Specimen: Peripheral Venipuncture; Blood culture   Result Value Ref Range    Blood Culture Loaded on Instrument - Culture in progress    Blood Culture    Specimen: Peripheral Venipuncture; Blood culture   Result Value Ref Range    Blood Culture Loaded on Instrument - Culture in progress         Microbiology data: reviewed    Imaging data: reviewed      Navi Moralesr:[phone]  Date of service: 3/6/2025  Time of  service: 5:46 PM

## 2025-03-08 LAB
ANION GAP SERPL CALC-SCNC: 8 MMOL/L (ref 10–20)
BACTERIA BLD AEROBE CULT: ABNORMAL
BACTERIA BLD CULT: ABNORMAL
BUN SERPL-MCNC: 8 MG/DL (ref 6–23)
CALCIUM SERPL-MCNC: 8.7 MG/DL (ref 8.6–10.3)
CHLORIDE SERPL-SCNC: 94 MMOL/L (ref 98–107)
CO2 SERPL-SCNC: 35 MMOL/L (ref 21–32)
CREAT SERPL-MCNC: 0.76 MG/DL (ref 0.5–1.05)
EGFRCR SERPLBLD CKD-EPI 2021: 81 ML/MIN/1.73M*2
GLUCOSE SERPL-MCNC: 121 MG/DL (ref 74–99)
GRAM STN SPEC: ABNORMAL
POTASSIUM SERPL-SCNC: 3.4 MMOL/L (ref 3.5–5.3)
SODIUM SERPL-SCNC: 134 MMOL/L (ref 136–145)

## 2025-03-08 PROCEDURE — 2500000001 HC RX 250 WO HCPCS SELF ADMINISTERED DRUGS (ALT 637 FOR MEDICARE OP): Performed by: INTERNAL MEDICINE

## 2025-03-08 PROCEDURE — 1210000001 HC SEMI-PRIVATE ROOM DAILY

## 2025-03-08 PROCEDURE — 36415 COLL VENOUS BLD VENIPUNCTURE: CPT | Performed by: INTERNAL MEDICINE

## 2025-03-08 PROCEDURE — 2500000004 HC RX 250 GENERAL PHARMACY W/ HCPCS (ALT 636 FOR OP/ED): Performed by: INTERNAL MEDICINE

## 2025-03-08 PROCEDURE — 82374 ASSAY BLOOD CARBON DIOXIDE: CPT | Performed by: INTERNAL MEDICINE

## 2025-03-08 PROCEDURE — 99233 SBSQ HOSP IP/OBS HIGH 50: CPT | Performed by: INTERNAL MEDICINE

## 2025-03-08 PROCEDURE — 2500000005 HC RX 250 GENERAL PHARMACY W/O HCPCS: Performed by: INTERNAL MEDICINE

## 2025-03-08 PROCEDURE — 80048 BASIC METABOLIC PNL TOTAL CA: CPT | Performed by: INTERNAL MEDICINE

## 2025-03-08 PROCEDURE — 94640 AIRWAY INHALATION TREATMENT: CPT

## 2025-03-08 PROCEDURE — 2500000002 HC RX 250 W HCPCS SELF ADMINISTERED DRUGS (ALT 637 FOR MEDICARE OP, ALT 636 FOR OP/ED): Performed by: INTERNAL MEDICINE

## 2025-03-08 RX ORDER — POTASSIUM CHLORIDE 20 MEQ/1
40 TABLET, EXTENDED RELEASE ORAL ONCE
Status: COMPLETED | OUTPATIENT
Start: 2025-03-08 | End: 2025-03-08

## 2025-03-08 RX ORDER — NYSTATIN 100000 [USP'U]/ML
5 SUSPENSION ORAL 4 TIMES DAILY
Status: DISCONTINUED | OUTPATIENT
Start: 2025-03-08 | End: 2025-03-09 | Stop reason: HOSPADM

## 2025-03-08 RX ADMIN — Medication 1 L/MIN: at 07:32

## 2025-03-08 RX ADMIN — HYDROCHLOROTHIAZIDE 12.5 MG: 12.5 CAPSULE ORAL at 09:31

## 2025-03-08 RX ADMIN — IPRATROPIUM BROMIDE AND ALBUTEROL SULFATE 3 ML: 2.5; .5 SOLUTION RESPIRATORY (INHALATION) at 07:31

## 2025-03-08 RX ADMIN — Medication 2 L/MIN: at 19:28

## 2025-03-08 RX ADMIN — PANTOPRAZOLE SODIUM 40 MG: 40 TABLET, DELAYED RELEASE ORAL at 08:34

## 2025-03-08 RX ADMIN — Medication 1 L/MIN: at 08:49

## 2025-03-08 RX ADMIN — LISINOPRIL 40 MG: 20 TABLET ORAL at 08:34

## 2025-03-08 RX ADMIN — POTASSIUM CHLORIDE 40 MEQ: 1500 TABLET, EXTENDED RELEASE ORAL at 13:53

## 2025-03-08 RX ADMIN — OSELTAMAVIR PHOSPHATE 75 MG: 75 CAPSULE ORAL at 20:21

## 2025-03-08 RX ADMIN — OSELTAMAVIR PHOSPHATE 75 MG: 75 CAPSULE ORAL at 08:34

## 2025-03-08 RX ADMIN — ENOXAPARIN SODIUM 40 MG: 100 INJECTION SUBCUTANEOUS at 16:47

## 2025-03-08 RX ADMIN — NYSTATIN 500000 UNITS: 100000 SUSPENSION ORAL at 14:53

## 2025-03-08 RX ADMIN — NYSTATIN 500000 UNITS: 100000 SUSPENSION ORAL at 20:21

## 2025-03-08 RX ADMIN — IPRATROPIUM BROMIDE AND ALBUTEROL SULFATE 3 ML: 2.5; .5 SOLUTION RESPIRATORY (INHALATION) at 11:04

## 2025-03-08 RX ADMIN — Medication 1000 MCG: at 08:35

## 2025-03-08 RX ADMIN — ACETAMINOPHEN 650 MG: 325 TABLET ORAL at 15:26

## 2025-03-08 RX ADMIN — SIMVASTATIN 40 MG: 20 TABLET, FILM COATED ORAL at 20:21

## 2025-03-08 RX ADMIN — IPRATROPIUM BROMIDE AND ALBUTEROL SULFATE 3 ML: 2.5; .5 SOLUTION RESPIRATORY (INHALATION) at 19:28

## 2025-03-08 RX ADMIN — CEFTRIAXONE 2 G: 2 INJECTION, SOLUTION INTRAVENOUS at 08:35

## 2025-03-08 RX ADMIN — ASPIRIN 81 MG CHEWABLE TABLET 81 MG: 81 TABLET CHEWABLE at 08:34

## 2025-03-08 RX ADMIN — Medication 2 L/MIN: at 11:04

## 2025-03-08 ASSESSMENT — COGNITIVE AND FUNCTIONAL STATUS - GENERAL
TOILETING: A LITTLE
CLIMB 3 TO 5 STEPS WITH RAILING: A LITTLE
DAILY ACTIVITIY SCORE: 17
MOBILITY SCORE: 21
MOVING TO AND FROM BED TO CHAIR: A LITTLE
DRESSING REGULAR LOWER BODY CLOTHING: A LOT
EATING MEALS: A LITTLE
PERSONAL GROOMING: A LITTLE
DRESSING REGULAR UPPER BODY CLOTHING: A LITTLE
HELP NEEDED FOR BATHING: A LITTLE
WALKING IN HOSPITAL ROOM: A LITTLE

## 2025-03-08 ASSESSMENT — ENCOUNTER SYMPTOMS
SORE THROAT: 0
VOMITING: 0
PALPITATIONS: 0
DYSURIA: 0
JOINT SWELLING: 0
CHEST TIGHTNESS: 0
HALLUCINATIONS: 0
FACIAL SWELLING: 0
FATIGUE: 0
LIGHT-HEADEDNESS: 0
NERVOUS/ANXIOUS: 0
WHEEZING: 0
COUGH: 1
ABDOMINAL PAIN: 0
HEMATURIA: 0
NAUSEA: 0
CHOKING: 0
CONFUSION: 0
CHILLS: 0
DIARRHEA: 0
WEAKNESS: 1
FEVER: 0
DIAPHORESIS: 0
NECK PAIN: 0
CONSTIPATION: 0
BACK PAIN: 0
ACTIVITY CHANGE: 1
WOUND: 0
AGITATION: 0
SHORTNESS OF BREATH: 1

## 2025-03-08 ASSESSMENT — PAIN - FUNCTIONAL ASSESSMENT
PAIN_FUNCTIONAL_ASSESSMENT: 0-10

## 2025-03-08 ASSESSMENT — PAIN SCALES - GENERAL
PAINLEVEL_OUTOF10: 0 - NO PAIN
PAINLEVEL_OUTOF10: 3
PAINLEVEL_OUTOF10: 0 - NO PAIN
PAINLEVEL_OUTOF10: 0 - NO PAIN

## 2025-03-08 NOTE — CARE PLAN
Problem: Safety - Adult  Goal: Free from fall injury  Outcome: Progressing     Problem: Discharge Planning  Goal: Discharge to home or other facility with appropriate resources  Outcome: Progressing     Problem: Chronic Conditions and Co-morbidities  Goal: Patient's chronic conditions and co-morbidity symptoms are monitored and maintained or improved  Outcome: Progressing     Problem: Nutrition  Goal: Nutrient intake appropriate for maintaining nutritional needs  Outcome: Progressing     Problem: Fall/Injury  Goal: Not fall by end of shift  Outcome: Progressing  Goal: Be free from injury by end of the shift  Outcome: Progressing  Goal: Verbalize understanding of personal risk factors for fall in the hospital  Outcome: Progressing  Goal: Verbalize understanding of risk factor reduction measures to prevent injury from fall in the home  Outcome: Progressing  Goal: Use assistive devices by end of the shift  Outcome: Progressing  Goal: Pace activities to prevent fatigue by end of the shift  Outcome: Progressing     Problem: Respiratory  Goal: Clear secretions with interventions this shift  Outcome: Progressing  Goal: Minimize anxiety/maximize coping throughout shift  Outcome: Progressing  Goal: Minimal/no exertional discomfort or dyspnea this shift  Outcome: Progressing  Goal: No signs of respiratory distress (eg. Use of accessory muscles. Peds grunting)  Outcome: Progressing  Goal: Patent airway maintained this shift  Outcome: Progressing  Goal: Tolerate mechanical ventilation evidenced by VS/agitation level this shift  Outcome: Progressing  Goal: Tolerate pulmonary toileting this shift  Outcome: Progressing  Goal: Verbalize decreased shortness of breath this shift  Outcome: Progressing  Goal: Wean oxygen to maintain O2 saturation per order/standard this shift  Outcome: Progressing  Goal: Increase self care and/or family involvement in next 24 hours  Outcome: Progressing     Problem: Skin  Goal: Decreased wound  size/increased tissue granulation at next dressing change  Outcome: Progressing  Flowsheets (Taken 3/7/2025 2151)  Decreased wound size/increased tissue granulation at next dressing change:   Promote sleep for wound healing   Protective dressings over bony prominences  Goal: Participates in plan/prevention/treatment measures  Outcome: Progressing  Flowsheets (Taken 3/7/2025 2151)  Participates in plan/prevention/treatment measures:   Discuss with provider PT/OT consult   Elevate heels   Increase activity/out of bed for meals  Goal: Prevent/manage excess moisture  Outcome: Progressing  Flowsheets (Taken 3/7/2025 2151)  Prevent/manage excess moisture:   Monitor for/manage infection if present   Cleanse incontinence/protect with barrier cream  Goal: Prevent/minimize sheer/friction injuries  Outcome: Progressing  Flowsheets (Taken 3/7/2025 2151)  Prevent/minimize sheer/friction injuries:   Increase activity/out of bed for meals   Use pull sheet   HOB 30 degrees or less   Turn/reposition every 2 hours/use positioning/transfer devices  Goal: Promote/optimize nutrition  Outcome: Progressing  Flowsheets (Taken 3/7/2025 2151)  Promote/optimize nutrition:   Monitor/record intake including meals   Consume > 50% meals/supplements  Goal: Promote skin healing  Outcome: Progressing  Flowsheets (Taken 3/7/2025 2151)  Promote skin healing:   Assess skin/pad under line(s)/device(s)   Protective dressings over bony prominences   Turn/reposition every 2 hours/use positioning/transfer devices     Problem: Pain  Goal: Takes deep breaths with improved pain control throughout the shift  Outcome: Progressing  Goal: Turns in bed with improved pain control throughout the shift  Outcome: Progressing  Goal: Walks with improved pain control throughout the shift  Outcome: Progressing  Goal: Performs ADL's with improved pain control throughout shift  Outcome: Progressing  Goal: Participates in PT with improved pain control throughout the  shift  Outcome: Progressing  Goal: Free from opioid side effects throughout the shift  Outcome: Progressing  Goal: Free from acute confusion related to pain meds throughout the shift  Outcome: Progressing   The patient's goals for the shift include      The clinical goals for the shift include patient's O2 will remain >= 92% throughout the shift.

## 2025-03-08 NOTE — ASSESSMENT & PLAN NOTE
-On adm, needing 2 L O2 to keep sats greater than 90%  -Suspect secondary to influenza and bacterial pneumonia below, wean O2 as able  3/4: Wean O2 as able.  3/4: Remains on 2 L.  3/6: Currently on 3 L O2.  Seems to vacillate between 2 to 3 L.  Wean as able.  3/7: Remains on 3 L.  Noted to desaturate with activity.  Suspect may need O2 on discharge.  3/8: Remains on 1 to 2 L O2 by NC.  May need to assess for home O2.

## 2025-03-08 NOTE — PROGRESS NOTES
Jenifer Means is a 79 y.o. female on day 4 of admission presenting with Pneumonia of right middle lobe due to Streptococcus pneumoniae (CMS-HCC).    Review of Systems   Constitutional:  Positive for activity change. Negative for chills, diaphoresis, fatigue and fever.   HENT:  Negative for congestion, facial swelling, sneezing and sore throat.    Respiratory:  Positive for cough and shortness of breath. Negative for choking, chest tightness and wheezing.    Cardiovascular:  Negative for chest pain, palpitations and leg swelling.   Gastrointestinal:  Negative for abdominal pain, constipation, diarrhea, nausea and vomiting.   Genitourinary:  Negative for dysuria, hematuria and urgency.   Musculoskeletal:  Negative for back pain, gait problem, joint swelling and neck pain.   Skin:  Negative for rash and wound.   Neurological:  Positive for weakness. Negative for syncope and light-headedness.   Psychiatric/Behavioral:  Negative for agitation, confusion and hallucinations. The patient is not nervous/anxious.    All other systems reviewed and are negative.     Subjective   Jenifer Means is a 79 y.o.-year-old with hx of hypertension and suspected dementia presents with generalized weakness and cough.  Patient was in her normal state of health until about 2 weeks ago when her daughter started noticing that she was weaker than usual and has been coughing.  This is gotten progressively worse since then.  Today patient went to get out of bed and slid down onto the floor.  Did not actually fall.  Did not hit her head.  No LOC event.  Patient currently denies shortness of breath, fevers, or chills.  No lower extremity edema.  No sick contacts at home.  In the ED, needing 4 L O2 to keep sats greater than 90%.  Pulse 112.  WBC 13.0.  Found to be positive for influenza A.  CXR with evidence of right-sided infiltrate.  Started on antibiotics and Tamiflu and admitted to medicine.     3/4: Patient seen.  Remains on  supplemental O2.  Feels fatigued, complains of myalgias.  Continue treatment for influenza A.  No urinary symptoms.  Discussed with family in room.  Will need another 24 to 48 hours of treatment.  Reassess in AM.  3/5: Patient seen.  Down to 2 L O2 by nasal cannula.  Cough is less productive.  Still feels tired.  Weak voice.  Patient is growing GPC from 2 sets of blood cultures.  Patient remains on ceftriaxone and doxycycline.  Will place consult to ID.  Await results before considering 2D echo.  3/6: Patient seen.  Remains on 2 to 3 L O2 by NC.  Blood cultures identified as pneumococcal pneumonia.  Pansensitive.  Appreciate input from ID.  Doxycycline discontinued.  Continue ceftriaxone.  Likely require another 24 to 48 hours treatment.  Discussed with patient.  3/7: Patient seen.  Remains on 3 L O2 by NC, nursing notes she needs more when she is up and active.  Has been able to move around the room somewhat independently.  Begin discharge planning.  Repeat cultures are negative at day 1.  Suspect may need home O2 on discharge and oral antibiotics.  Appreciate input from ID.  3/8: Patient seen.  Currently on 1 to 2 L O2 by NC.  Saturations appear to be better after pulse oximeter was adjusted.  Blood cultures are negative x 2 days on repeat set.  Discussed with ID, okay to discharge home on amoxicillin.  May need home O2 for set up.  Patient states her  is sick and wants me to speak to another family member.  Left a message.  Awaiting contact from family regarding discharge planning.       Objective     Last Recorded Vitals  /78 (BP Location: Right arm, Patient Position: Lying)   Pulse 100   Temp 36.3 °C (97.3 °F) (Temporal)   Resp 16   Wt 79.5 kg (175 lb 4.3 oz)   SpO2 91%   Intake/Output last 3 Shifts:    Intake/Output Summary (Last 24 hours) at 3/8/2025 1232  Last data filed at 3/8/2025 1122  Gross per 24 hour   Intake 370 ml   Output 200 ml   Net 170 ml       Admission Weight  Weight: 78.9 kg  (174 lb) (03/03/25 2041)    Daily Weight  03/04/25 : 79.5 kg (175 lb 4.3 oz)      Physical Exam  Constitutional:       General: She is not in acute distress.  HENT:      Head: Normocephalic and atraumatic.      Nose: Nose normal. No congestion or rhinorrhea.      Mouth/Throat:      Mouth: Mucous membranes are dry.      Pharynx: Oropharynx is clear.   Eyes:      General: No scleral icterus.     Extraocular Movements: Extraocular movements intact.      Pupils: Pupils are equal, round, and reactive to light.   Cardiovascular:      Rate and Rhythm: Normal rate and regular rhythm.      Heart sounds: Normal heart sounds. No murmur heard.     No friction rub. No gallop.   Pulmonary:      Effort: Pulmonary effort is normal.      Breath sounds: Normal breath sounds. No wheezing, rhonchi or rales.   Chest:      Chest wall: No tenderness.   Abdominal:      General: There is no distension.      Palpations: Abdomen is soft.      Tenderness: There is no abdominal tenderness. There is no guarding or rebound.   Musculoskeletal:         General: No swelling, tenderness or signs of injury. Normal range of motion.      Cervical back: Normal range of motion.   Skin:     General: Skin is warm and dry.      Coloration: Skin is not jaundiced.      Findings: No bruising, erythema or rash.   Neurological:      General: No focal deficit present.      Mental Status: She is alert and oriented to person, place, and time.          Lab Results  Results for orders placed or performed during the hospital encounter of 03/03/25 (from the past 24 hours)   Basic metabolic panel   Result Value Ref Range    Glucose 121 (H) 74 - 99 mg/dL    Sodium 134 (L) 136 - 145 mmol/L    Potassium 3.4 (L) 3.5 - 5.3 mmol/L    Chloride 94 (L) 98 - 107 mmol/L    Bicarbonate 35 (H) 21 - 32 mmol/L    Anion Gap 8 (L) 10 - 20 mmol/L    Urea Nitrogen 8 6 - 23 mg/dL    Creatinine 0.76 0.50 - 1.05 mg/dL    eGFR 81 >60 mL/min/1.73m*2    Calcium 8.7 8.6 - 10.3 mg/dL        Image  Results  CT angio chest for pulmonary embolism  Narrative: Interpreted By:  Bautista Ramirez,   STUDY:  CT ANGIO CHEST FOR PULMONARY EMBOLISM;  3/7/2025 6:03 pm      INDICATION:  Signs/Symptoms:Hypoxia.          COMPARISON:  None.      ACCESSION NUMBER(S):  [id number]      ORDERING CLINICIAN:  GRABIEL SYKES      TECHNIQUE:  Contiguous axial images of the chest were obtained after the  intravenous administration of iodinated contrast using angiographic  PE protocol. Coronal and sagittal reformatted images were  reconstructed from the axial data. MIP images were created on an  independent workstation and reviewed.      FINDINGS:      MEDIASTINUM AND LYMPH NODES:  The esophageal wall appears within  normal limits. Numerous calcified intrathoracic lymph nodes  consistent with remote granulomatous infection. Mild enlargement of  intrathoracic lymph nodes. No pneumomediastinum.      VESSELS:  Normal caliber thoracic aorta without dissection. Mild  aortic atherosclerosis.  No acute pulmonary embolism.      HEART: Normal size.  Moderate coronary artery calcifications. No  significant pericardial effusion.      LUNG, AIRWAYS, PLEURA: There is complete right middle lobe collapse.  There is segmental atelectasis in multiple locations in the left  lower lobe. There is patchy consolidation in the posterior right  lower lobe with superimposed tree-in-bud opacities. There are patchy  ground-glass and consolidative opacities in the right middle lobe.  There are patchy ground-glass/consolidative opacities in the right  upper lobe. There is diffuse bronchial wall thickening. There is  transient occlusion of the left lower lobe bronchus that may be due  to aspirated debris or mucous plugging. There is a trace left pleural  effusion. Scattered subcentimeter bilateral pulmonary nodules likely  represent calcified and noncalcified granulomas. No consolidation,  pulmonary edema, pleural effusion or pneumothorax.      OSSEOUS STRUCTURES: No  acute osseous abnormality.      CHEST WALL SOFT TISSUES: No discernible acute abnormality. There is a  left chest wall port catheter.      UPPER ABDOMEN/OTHER: No acute abnormality. Calcified splenic and  hepatic granulomas are noted. Surgically absent gallbladder. Gastric  lap band noted in appropriate position.      Impression: Bunion throughout the right upper, middle, and lower lobes.      Remote sequela of granulomatous infection with bilateral calcified  granulomas and intrathoracic calcified lymph nodes.      Transient occlusion of the left lower lobe bronchus which could be  due to mucous debris or trace aspiration. Graft complete right middle  lobe collapse which is felt to be due to mass effect from calcified  right hilar lymphadenopathy.      No acute pulmonary embolism.      MACRO:  None.      Signed by: Bautista Ramirez 3/7/2025 7:03 PM  Dictation workstation:   [id number]  XR chest 1 view  Narrative: Interpreted By:  Con Conteh,   STUDY:  XR CHEST 1 VIEW; ;  3/7/2025 2:11 pm      INDICATION:  Signs/Symptoms:Hypoxia, eval pneumonia.      COMPARISON:  03/03/2025      ACCESSION NUMBER(S):  [id number]      ORDERING CLINICIAN:  GRABIEL SYKES      TECHNIQUE:  A portable radiograph of the chest is performed.      FINDINGS:  The heart is of normal size and contour. There are multiple calcified  granuloma and calcified lymph nodes bilaterally. The pulmonary  vessels are unchanged. There is persistent infiltrate and atelectasis  in the left lung base. There is some improved aeration of the right  lung base. There is no new airspace consolidation, pleural effusion,  or pneumothorax. The osseous structures are diffusely demineralized.      A port overlies the left mid thorax laterally and extends inferiorly  beyond the field of imaging.      Impression: Old granulomatous disease of the chest.      Persistent infiltrate and atelectasis in the left lung base.  Continued follow-up is recommended to  resolution.              MACRO:  None      Signed by: Con Conteh 3/7/2025 2:43 PM  Dictation workstation:   [id number]       Assessment/Plan     Assessment & Plan  Influenza A  Tamiflu 75 mg twice daily  Pneumonia of right middle lobe due to Streptococcus pneumoniae (CMS-[facility])  Sepsis 2/2 Secondary Bacterial Pneumonia  -On admission, met SIRS criteria with evidence of pulmonary source  -Ceftriaxone 2 g daily  -Doxycycline 100 mg twice daily  3/4: Blood cultures are negative at day 1.  No urinary antigens were sent.  Suspect pneumococcal pneumonia.  3/5: Blood cultures now growing GPC in pairs and chains.  Could be pneumococcal pneumonia.  Awaiting results.  Remains on ceftriaxone.  Consult ID.  Consider 2D echo if staph.  3/6: Identified as pneumococcal pneumonia.  Sensitive to all drugs tested.  Doxycycline has been discontinued per ID.  Appreciate ID recommendations.  Would likely require another 24 to 48 hours of antibiotic treatment.  3/7: Repeat cultures are negative at day 1.  Continue ceftriaxone.  Suspect okay for oral medications on discharge.  Await input from ID.  3/8: Repeat cultures negative at day 2.  Per ID can discharge home on amoxicillin through 3/30.  Will need follow-up chest x-ray in 3 to 4 weeks.  Acute hypoxemic respiratory failure (Multi)  -On adm, needing 2 L O2 to keep sats greater than 90%  -Suspect secondary to influenza and bacterial pneumonia below, wean O2 as able  3/4: Wean O2 as able.  3/4: Remains on 2 L.  3/6: Currently on 3 L O2.  Seems to vacillate between 2 to 3 L.  Wean as able.  3/7: Remains on 3 L.  Noted to desaturate with activity.  Suspect may need O2 on discharge.  3/8: Remains on 1 to 2 L O2 by NC.  May need to assess for home O2.  Sepsis (Multi)  Sepsis 2/2 Secondary pneumococcal pneumonia  -On admission, met SIRS criteria with evidence of pulmonary source  -Ceftriaxone 2 g daily  -Doxycycline 100 mg twice daily  3/5: GPC bacteremia.  May be strep.  Consult  ID.  Continue current antibiotics.  3/6: Continue antibiotics as noted above.  Troponin level elevated  Type II MI: Suspect demand in setting of above, treatment per above.  Follow-up with PCP as outpatient.  Essential hypertension  Home meds  Hyperlipidemia  Home meds  Hypercholesterolemia  Home meds  Hypokalemia  Noted to be slightly low.  Replace and recheck.  3/8: Replaced again.  Close to normal.    Patient has asked me to speak to her family regarding discharge.  Apparently her  is currently sick.  Left a message with the  she elected, Lois.  Consider discharge home if they can take her.           Jones Espinosa MD

## 2025-03-08 NOTE — ASSESSMENT & PLAN NOTE
Sepsis 2/2 Secondary Bacterial Pneumonia  -On admission, met SIRS criteria with evidence of pulmonary source  -Ceftriaxone 2 g daily  -Doxycycline 100 mg twice daily  3/4: Blood cultures are negative at day 1.  No urinary antigens were sent.  Suspect pneumococcal pneumonia.  3/5: Blood cultures now growing GPC in pairs and chains.  Could be pneumococcal pneumonia.  Awaiting results.  Remains on ceftriaxone.  Consult ID.  Consider 2D echo if staph.  3/6: Identified as pneumococcal pneumonia.  Sensitive to all drugs tested.  Doxycycline has been discontinued per ID.  Appreciate ID recommendations.  Would likely require another 24 to 48 hours of antibiotic treatment.  3/7: Repeat cultures are negative at day 1.  Continue ceftriaxone.  Suspect okay for oral medications on discharge.  Await input from ID.  3/8: Repeat cultures negative at day 2.  Per ID can discharge home on amoxicillin through 3/30.  Will need follow-up chest x-ray in 3 to 4 weeks.

## 2025-03-08 NOTE — CARE PLAN
Problem: Safety - Adult  Goal: Free from fall injury  Outcome: Progressing     Problem: Discharge Planning  Goal: Discharge to home or other facility with appropriate resources  Outcome: Progressing     Problem: Chronic Conditions and Co-morbidities  Goal: Patient's chronic conditions and co-morbidity symptoms are monitored and maintained or improved  Outcome: Progressing     Problem: Nutrition  Goal: Nutrient intake appropriate for maintaining nutritional needs  Outcome: Progressing     Problem: Fall/Injury  Goal: Not fall by end of shift  Outcome: Progressing  Goal: Be free from injury by end of the shift  Outcome: Progressing  Goal: Verbalize understanding of personal risk factors for fall in the hospital  Outcome: Progressing  Goal: Verbalize understanding of risk factor reduction measures to prevent injury from fall in the home  Outcome: Progressing  Goal: Use assistive devices by end of the shift  Outcome: Progressing  Goal: Pace activities to prevent fatigue by end of the shift  Outcome: Progressing     Problem: Respiratory  Goal: Clear secretions with interventions this shift  Outcome: Progressing  Goal: Minimize anxiety/maximize coping throughout shift  Outcome: Progressing  Goal: Minimal/no exertional discomfort or dyspnea this shift  Outcome: Progressing  Goal: No signs of respiratory distress (eg. Use of accessory muscles. Peds grunting)  Outcome: Progressing  Goal: Patent airway maintained this shift  Outcome: Progressing  Goal: Tolerate mechanical ventilation evidenced by VS/agitation level this shift  Outcome: Progressing  Goal: Tolerate pulmonary toileting this shift  Outcome: Progressing  Goal: Verbalize decreased shortness of breath this shift  Outcome: Progressing  Goal: Wean oxygen to maintain O2 saturation per order/standard this shift  Outcome: Progressing  Goal: Increase self care and/or family involvement in next 24 hours  Outcome: Progressing     Problem: Skin  Goal: Decreased wound  size/increased tissue granulation at next dressing change  Outcome: Progressing  Flowsheets (Taken 3/8/2025 0751)  Decreased wound size/increased tissue granulation at next dressing change:   Promote sleep for wound healing   Protective dressings over bony prominences  Goal: Participates in plan/prevention/treatment measures  Outcome: Progressing  Flowsheets (Taken 3/8/2025 0751)  Participates in plan/prevention/treatment measures:   Discuss with provider PT/OT consult   Elevate heels   Increase activity/out of bed for meals  Goal: Prevent/manage excess moisture  Outcome: Progressing  Flowsheets (Taken 3/8/2025 0751)  Prevent/manage excess moisture:   Monitor for/manage infection if present   Follow provider orders for dressing changes  Goal: Prevent/minimize sheer/friction injuries  Outcome: Progressing  Flowsheets (Taken 3/8/2025 0751)  Prevent/minimize sheer/friction injuries:   Turn/reposition every 2 hours/use positioning/transfer devices   Increase activity/out of bed for meals  Goal: Promote/optimize nutrition  Outcome: Progressing  Flowsheets (Taken 3/8/2025 0751)  Promote/optimize nutrition:   Monitor/record intake including meals   Consume > 50% meals/supplements  Goal: Promote skin healing  Outcome: Progressing  Flowsheets (Taken 3/8/2025 0751)  Promote skin healing: Assess skin/pad under line(s)/device(s)     Problem: Pain  Goal: Takes deep breaths with improved pain control throughout the shift  Outcome: Progressing  Goal: Turns in bed with improved pain control throughout the shift  Outcome: Progressing  Goal: Walks with improved pain control throughout the shift  Outcome: Progressing  Goal: Performs ADL's with improved pain control throughout shift  Outcome: Progressing  Goal: Participates in PT with improved pain control throughout the shift  Outcome: Progressing  Goal: Free from opioid side effects throughout the shift  Outcome: Progressing  Goal: Free from acute confusion related to pain meds  throughout the shift  Outcome: Progressing

## 2025-03-08 NOTE — PROGRESS NOTES
[facility] INFECTIOUS DISEASE PROGRESS NOTE    Patient Name: Jenifer Means  MRN: [id number]    INTERVAL HISTORY:   No fevers. Feels better. Remains on 2 to 3 L O2 by NC.     Patient Active Problem List   Diagnosis    Influenza A    Acute hypoxemic respiratory failure (Multi)    Pneumonia of right middle lobe due to Streptococcus pneumoniae (CMS-HCC)    Sepsis (Multi)    Troponin level elevated    Essential hypertension    Hyperlipidemia    Hypercholesterolemia    Low back pain    Diverticular disease of colon    Hypokalemia        ASSESSMENT:   Sepsis  CAP w strep pneumoniae  Pneumocococcal bacteremia  Influenza A  Acute hypoxic resp failure  HTN  HLD     Plan  Ceftriaxone  Tamiflu  Repeat Bcx x 2 negative x 1 day  Follow O2 levels  Rev MICs of the Bcx isolate. PSSP present    Discharge planning on PO Amoxilillin 1000 mg TID till 3/20/25    MEDICATIONS: reviewed.    Current Facility-Administered Medications:     acetaminophen (Tylenol) tablet 650 mg, 650 mg, oral, q4h PRN, 650 mg at 03/07/25 1601 **OR** acetaminophen (Tylenol) oral liquid 650 mg, 650 mg, nasogastric tube, q4h PRN **OR** acetaminophen (Tylenol) suppository 650 mg, 650 mg, rectal, q4h PRN, Con Elkins MD    alum-mag hydroxide-simeth (Mylanta) 200-200-20 mg/5 mL oral suspension 30 mL, 30 mL, oral, q6h PRN, Con Elkins MD    aspirin chewable tablet 81 mg, 81 mg, oral, Daily, Jones Espinosa MD, 81 mg at 03/07/25 0833    benzocaine-menthol (Cepastat Sore Throat) lozenge 1 lozenge, 1 lozenge, Mouth/Throat, q2h PRN, Con Elkisn MD    cefTRIAXone (Rocephin) 2 g in dextrose (iso) IV 50 mL, 2 g, intravenous, q24h, Con Elkins MD, Stopped at 03/07/25 0913    cyanocobalamin (Vitamin B-12) tablet 1,000 mcg, 1,000 mcg, oral, Daily, Jones Espinosa MD, 1,000 mcg at 03/07/25 0833    dextromethorphan-guaifenesin (Robitussin DM)  mg/5 mL oral liquid 5 mL, 5 mL, oral, q4h PRN, Con Elkins MD    enoxaparin (Lovenox) syringe 40 mg, 40  "mg, subcutaneous, q24h, Jones Espinosa MD, 40 mg at 25 1600    guaiFENesin (Mucinex) 12 hr tablet 600 mg, 600 mg, oral, q12h PRN, Con Elkins MD    hydroCHLOROthiazide (Microzide) capsule 12.5 mg, 12.5 mg, oral, q24h, Jones Espinosa MD, 12.5 mg at 25    ipratropium-albuteroL (Duo-Neb) 0.5-2.5 mg/3 mL nebulizer solution 3 mL, 3 mL, nebulization, q4h PRN, Con Elkins MD    ipratropium-albuteroL (Duo-Neb) 0.5-2.5 mg/3 mL nebulizer solution 3 mL, 3 mL, nebulization, TID, Con Elkins MD, 3 mL at 25    lisinopril tablet 40 mg, 40 mg, oral, Daily, Jones Espinosa MD, 40 mg at 25    melatonin tablet 3 mg, 3 mg, oral, Nightly PRN, Con Elkins MD    ondansetron (Zofran) tablet 4 mg, 4 mg, oral, q8h PRN **OR** ondansetron (Zofran) injection 4 mg, 4 mg, intravenous, q8h PRN, Con Elkins MD    oseltamivir (Tamiflu) capsule 75 mg, 75 mg, oral, BID, Jones Espinosa MD, 75 mg at 25    oxygen (O2) therapy, , inhalation, Continuous - Inhalation, Jones Espinosa MD, Last Rate: 180,000 mL/hr at 25, 3 L/min at 25    pantoprazole (ProtoNix) EC tablet 40 mg, 40 mg, oral, Daily, Jones Espinosa MD, 40 mg at 25    polyethylene glycol (Glycolax, Miralax) packet 17 g, 17 g, oral, BID PRN, Con Elkins MD    simvastatin (Zocor) tablet 40 mg, 40 mg, oral, Nightly, Jones Espinosa MD, 40 mg at 25     PHYSICAL EXAM:  Vital signs: /70 (BP Location: Right arm, Patient Position: Lying)   Pulse 85   Temp 36.7 °C (98 °F) (Temporal)   Resp 17   Ht 1.651 m (5' 5\")   Wt 79.5 kg (175 lb 4.3 oz)   SpO2 92%   BMI 29.17 kg/m²   Temp (24hrs), Av.5 °C (97.7 °F), Min:35.8 °C (96.5 °F), Max:36.9 °C (98.4 °F)    General: alert, oriented, NAD  Lungs: bilaterally clear to auscultation  Heart: regular rate and rhythm  Abdomen: soft, non tender, non distended, BS+  Extremities: no edema  No rashes  No joint inflammation  Neck supple  Lines ok  No " CVAT              Labs:    Results for orders placed or performed during the hospital encounter of 03/03/25 (from the past 96 hours)   Lactate   Result Value Ref Range    Lactate 1.1 0.4 - 2.0 mmol/L   Blood Culture    Specimen: Peripheral Venipuncture; Blood culture   Result Value Ref Range    Blood Culture Streptococcus pneumoniae (A)     BLOOD CULTURE BOTTLE  Positive Aerobic and Anaerobic Bottle     Gram Stain Gram positive cocci, pairs and chains (AA)     Gram Stain Gram positive cocci, pairs and chains (AA)        Susceptibility    Streptococcus pneumoniae - DISK DIFFUSION     Clindamycin  Susceptible mm     Erythromycin  Susceptible mm     Tetracycline  Susceptible mm     Trimethoprim/Sulfamethoxazole  Susceptible mm     Vancomycin  Susceptible mm    Streptococcus pneumoniae - GRADIENT DIFFUSION     Penicillin (Oral)  Susceptible ug/ml     Penicillin (IV, Meningitis)  Susceptible ug/ml     Penicillin (IV, Nonmeningitis)  Susceptible ug/ml     Ceftriaxone (Meningitis)  Susceptible ug/ml     Ceftriaxone (Nonmeningitis)  Susceptible ug/ml   Blood Culture    Specimen: Peripheral Venipuncture; Blood culture   Result Value Ref Range    Blood Culture Streptococcus pneumoniae (A)     BLOOD CULTURE BOTTLE  Positive Aerobic Bottle     Gram Stain Gram positive cocci, pairs and chains (AA)    MRSA Surveillance for Vancomycin De-escalation, PCR    Specimen: Anterior Nares; Swab   Result Value Ref Range    MRSA PCR Not Detected Not Detected   CBC   Result Value Ref Range    WBC 14.2 (H) 4.4 - 11.3 x10*3/uL    nRBC 0.0 0.0 - 0.0 /100 WBCs    RBC 4.42 4.00 - 5.20 x10*6/uL    Hemoglobin 11.6 (L) 12.0 - 16.0 g/dL    Hematocrit 35.7 (L) 36.0 - 46.0 %    MCV 81 80 - 100 fL    MCH 26.2 26.0 - 34.0 pg    MCHC 32.5 32.0 - 36.0 g/dL    RDW 13.9 11.5 - 14.5 %    Platelets 383 150 - 450 x10*3/uL   Basic metabolic panel   Result Value Ref Range    Glucose 101 (H) 74 - 99 mg/dL    Sodium 131 (L) 136 - 145 mmol/L    Potassium 3.5 3.5 -  5.3 mmol/L    Chloride 91 (L) 98 - 107 mmol/L    Bicarbonate 30 21 - 32 mmol/L    Anion Gap 14 10 - 20 mmol/L    Urea Nitrogen 16 6 - 23 mg/dL    Creatinine 0.61 0.50 - 1.05 mg/dL    eGFR >90 >60 mL/min/1.73m*2    Calcium 8.9 8.6 - 10.3 mg/dL   Urinalysis with Reflex Culture and Microscopic   Result Value Ref Range    Color, Urine Yellow Light-Yellow, Yellow, Dark-Yellow    Appearance, Urine Turbid (N) Clear    Specific Gravity, Urine 1.036 (N) 1.005 - 1.035    pH, Urine 6.0 5.0, 5.5, 6.0, 6.5, 7.0, 7.5, 8.0    Protein, Urine 100 (2+) (A) NEGATIVE, 10 (TRACE), 20 (TRACE) mg/dL    Glucose, Urine 30 (TRACE) (A) Normal mg/dL    Blood, Urine 0.06 (1+) (A) NEGATIVE mg/dL    Ketones, Urine 20 (1+) (A) NEGATIVE mg/dL    Bilirubin, Urine NEGATIVE NEGATIVE mg/dL    Urobilinogen, Urine Normal Normal mg/dL    Nitrite, Urine NEGATIVE NEGATIVE    Leukocyte Esterase, Urine 75 Dax/uL (A) NEGATIVE   Extra Urine Gray Tube   Result Value Ref Range    Extra Tube Hold for add-ons.    Microscopic Only, Urine   Result Value Ref Range    WBC, Urine 21-50 (A) 1-5, NONE /HPF    RBC, Urine 11-20 (A) NONE, 1-2, 3-5 /HPF    Squamous Epithelial Cells, Urine 1-9 (SPARSE) Reference range not established. /HPF    Bacteria, Urine 1+ (A) NONE SEEN /HPF    Mucus, Urine 2+ Reference range not established. /LPF    Hyaline Casts, Urine 1+ (A) NONE /LPF   Urine Culture    Specimen: Clean Catch/Voided; Urine   Result Value Ref Range    Urine Culture       Clinically insignificant growth based on current clinical standards.   Basic Metabolic Panel   Result Value Ref Range    Glucose 90 74 - 99 mg/dL    Sodium 138 136 - 145 mmol/L    Potassium 4.1 3.5 - 5.3 mmol/L    Chloride 94 (L) 98 - 107 mmol/L    Bicarbonate 33 (H) 21 - 32 mmol/L    Anion Gap 15 10 - 20 mmol/L    Urea Nitrogen 16 6 - 23 mg/dL    Creatinine 0.84 0.50 - 1.05 mg/dL    eGFR 72 >60 mL/min/1.73m*2    Calcium 9.2 8.6 - 10.3 mg/dL   CBC   Result Value Ref Range    WBC 14.2 (H) 4.4 - 11.3  x10*3/uL    nRBC 0.0 0.0 - 0.0 /100 WBCs    RBC 4.28 4.00 - 5.20 x10*6/uL    Hemoglobin 11.1 (L) 12.0 - 16.0 g/dL    Hematocrit 35.3 (L) 36.0 - 46.0 %    MCV 83 80 - 100 fL    MCH 25.9 (L) 26.0 - 34.0 pg    MCHC 31.4 (L) 32.0 - 36.0 g/dL    RDW 14.1 11.5 - 14.5 %    Platelets 426 150 - 450 x10*3/uL   Blood Culture    Specimen: Peripheral Venipuncture; Blood culture   Result Value Ref Range    Blood Culture No growth at 1 day    Blood Culture    Specimen: Peripheral Venipuncture; Blood culture   Result Value Ref Range    Blood Culture No growth at 1 day    CBC   Result Value Ref Range    WBC 9.3 4.4 - 11.3 x10*3/uL    nRBC 0.0 0.0 - 0.0 /100 WBCs    RBC 3.86 (L) 4.00 - 5.20 x10*6/uL    Hemoglobin 10.3 (L) 12.0 - 16.0 g/dL    Hematocrit 32.2 (L) 36.0 - 46.0 %    MCV 83 80 - 100 fL    MCH 26.7 26.0 - 34.0 pg    MCHC 32.0 32.0 - 36.0 g/dL    RDW 14.4 11.5 - 14.5 %    Platelets 453 (H) 150 - 450 x10*3/uL   Basic Metabolic Panel   Result Value Ref Range    Glucose 98 74 - 99 mg/dL    Sodium 135 (L) 136 - 145 mmol/L    Potassium 3.0 (L) 3.5 - 5.3 mmol/L    Chloride 95 (L) 98 - 107 mmol/L    Bicarbonate 35 (H) 21 - 32 mmol/L    Anion Gap 8 (L) 10 - 20 mmol/L    Urea Nitrogen 13 6 - 23 mg/dL    Creatinine 0.78 0.50 - 1.05 mg/dL    eGFR 78 >60 mL/min/1.73m*2    Calcium 8.6 8.6 - 10.3 mg/dL        Microbiology data: reviewed    Imaging data: reviewed      Navi Loo  Pager:[phone]  Date of service: 3/7/2025  Time of service: 5:46 PM

## 2025-03-08 NOTE — DOCUMENTATION CLARIFICATION NOTE
"    PATIENT:               ESTELA COX  ACCT #:                  [id number]  MRN:                       [id number]  :                       1945  ADMIT DATE:       3/3/2025 8:41 PM  DISCH DATE:  RESPONDING PROVIDER #:        [id number]          PROVIDER RESPONSE TEXT:    Sepsis with multi-system organ dysfunctions of acute respiratory failure and demand ischemia    CDI QUERY TEXT:    Clarification    Instruction:    Based on your assessment of the patient and the clinical information, please provide the requested documentation by clicking on the appropriate radio button and enter any additional information if prompted.    Question: Please further clarify if a relationship exists between the Sepsis and acute organ dysfunction    When answering this query, please exercise your independent professional judgment. The fact that a question is being asked, does not imply that any particular answer is desired or expected.    The patient's clinical indicators include:  Clinical Information: Patient admitted with pneumonia with noted sepsis    Clinical Indicators: Per ED, \"Acute respiratory failure with hypoxia  Pneumonia due to infectious organism, unspecified laterality, unspecified part of lung.\"    Per H and P, \"Acute hypoxic respiratory failure  Sepsis 2/2 Secondary Bacterial Pneumonia  -On admission, met SIRS criteria with evidence of pulmonary source  Influenza A pneumonia  Type II MI: Suspect demand in setting of above, treatment per above.\"     troponins: 23, 21     EKG: Sinus tachycardia  Left atrial enlargement  Probable anteroseptal infarct, old    Treatment: IV rocephin- active, PO doxycycline- active, IV zosyn- completed, IV vanco- completed, monitoring labs/vitals, supplemental O2, duonebs, EKG    Risk Factors: 79yof admitted with pneumonia with noted sepsis  Options provided:  -- Sepsis with multi-system organ dysfunctions of acute respiratory failure and demand ischemia  -- Sepsis with " multi-system organ dysfunctions of acute respiratory failure and type II MI  -- Sepsis with other organ dysfunction, Please specify sepsis associated organ dysfunction below  -- Other - I will add my own diagnosis  -- Refer to Clinical Documentation Reviewer    Query created by: Angelita Issa on 3/5/2025 2:21 PM      Electronically signed by:  GRABIEL SYKES MD 3/8/2025 6:14 PM

## 2025-03-09 ENCOUNTER — PHARMACY VISIT (OUTPATIENT)
Dept: PHARMACY | Facility: CLINIC | Age: 80
End: 2025-03-09
Payer: COMMERCIAL

## 2025-03-09 VITALS
SYSTOLIC BLOOD PRESSURE: 147 MMHG | RESPIRATION RATE: 18 BRPM | DIASTOLIC BLOOD PRESSURE: 83 MMHG | WEIGHT: 175.27 LBS | HEART RATE: 108 BPM | HEIGHT: 65 IN | BODY MASS INDEX: 29.2 KG/M2 | OXYGEN SATURATION: 92 % | TEMPERATURE: 97.3 F

## 2025-03-09 LAB
ANION GAP SERPL CALC-SCNC: 13 MMOL/L (ref 10–20)
BACTERIA BLD CULT: NORMAL
BACTERIA BLD CULT: NORMAL
BUN SERPL-MCNC: 7 MG/DL (ref 6–23)
CALCIUM SERPL-MCNC: 8.6 MG/DL (ref 8.6–10.3)
CHLORIDE SERPL-SCNC: 96 MMOL/L (ref 98–107)
CO2 SERPL-SCNC: 33 MMOL/L (ref 21–32)
CREAT SERPL-MCNC: 0.73 MG/DL (ref 0.5–1.05)
EGFRCR SERPLBLD CKD-EPI 2021: 85 ML/MIN/1.73M*2
GLUCOSE SERPL-MCNC: 103 MG/DL (ref 74–99)
POTASSIUM SERPL-SCNC: 3.9 MMOL/L (ref 3.5–5.3)
SODIUM SERPL-SCNC: 138 MMOL/L (ref 136–145)

## 2025-03-09 PROCEDURE — RXMED WILLOW AMBULATORY MEDICATION CHARGE

## 2025-03-09 PROCEDURE — 2500000004 HC RX 250 GENERAL PHARMACY W/ HCPCS (ALT 636 FOR OP/ED): Performed by: INTERNAL MEDICINE

## 2025-03-09 PROCEDURE — 2500000002 HC RX 250 W HCPCS SELF ADMINISTERED DRUGS (ALT 637 FOR MEDICARE OP, ALT 636 FOR OP/ED): Performed by: INTERNAL MEDICINE

## 2025-03-09 PROCEDURE — 94640 AIRWAY INHALATION TREATMENT: CPT

## 2025-03-09 PROCEDURE — 99239 HOSP IP/OBS DSCHRG MGMT >30: CPT | Performed by: INTERNAL MEDICINE

## 2025-03-09 PROCEDURE — 36415 COLL VENOUS BLD VENIPUNCTURE: CPT | Performed by: INTERNAL MEDICINE

## 2025-03-09 PROCEDURE — 80048 BASIC METABOLIC PNL TOTAL CA: CPT | Performed by: INTERNAL MEDICINE

## 2025-03-09 PROCEDURE — 2500000001 HC RX 250 WO HCPCS SELF ADMINISTERED DRUGS (ALT 637 FOR MEDICARE OP): Performed by: INTERNAL MEDICINE

## 2025-03-09 RX ORDER — ALBUTEROL SULFATE 90 UG/1
2 INHALANT RESPIRATORY (INHALATION) EVERY 4 HOURS PRN
Qty: 6.7 G | Refills: 0 | Status: SHIPPED | OUTPATIENT
Start: 2025-03-09

## 2025-03-09 RX ORDER — AMOXICILLIN 500 MG/1
1000 CAPSULE ORAL 3 TIMES DAILY
Qty: 72 CAPSULE | Refills: 0 | Status: SHIPPED | OUTPATIENT
Start: 2025-03-09 | End: 2025-03-21

## 2025-03-09 RX ADMIN — CEFTRIAXONE 2 G: 2 INJECTION, SOLUTION INTRAVENOUS at 07:48

## 2025-03-09 RX ADMIN — NYSTATIN 500000 UNITS: 100000 SUSPENSION ORAL at 07:53

## 2025-03-09 RX ADMIN — Medication 1000 MCG: at 07:54

## 2025-03-09 RX ADMIN — HYDROCHLOROTHIAZIDE 12.5 MG: 12.5 CAPSULE ORAL at 10:27

## 2025-03-09 RX ADMIN — PANTOPRAZOLE SODIUM 40 MG: 40 TABLET, DELAYED RELEASE ORAL at 07:53

## 2025-03-09 RX ADMIN — LISINOPRIL 40 MG: 20 TABLET ORAL at 07:55

## 2025-03-09 RX ADMIN — IPRATROPIUM BROMIDE AND ALBUTEROL SULFATE 3 ML: 2.5; .5 SOLUTION RESPIRATORY (INHALATION) at 07:36

## 2025-03-09 RX ADMIN — ASPIRIN 81 MG CHEWABLE TABLET 81 MG: 81 TABLET CHEWABLE at 07:53

## 2025-03-09 RX ADMIN — IPRATROPIUM BROMIDE AND ALBUTEROL SULFATE 3 ML: 2.5; .5 SOLUTION RESPIRATORY (INHALATION) at 11:00

## 2025-03-09 ASSESSMENT — COGNITIVE AND FUNCTIONAL STATUS - GENERAL
TOILETING: A LITTLE
WALKING IN HOSPITAL ROOM: A LITTLE
DAILY ACTIVITIY SCORE: 22
MOBILITY SCORE: 20
STANDING UP FROM CHAIR USING ARMS: A LITTLE
CLIMB 3 TO 5 STEPS WITH RAILING: A LOT
EATING MEALS: A LITTLE

## 2025-03-09 ASSESSMENT — PAIN SCALES - GENERAL: PAINLEVEL_OUTOF10: 0 - NO PAIN

## 2025-03-09 NOTE — ASSESSMENT & PLAN NOTE
Sepsis 2/2 Secondary pneumococcal pneumonia  -On admission, met SIRS criteria with evidence of pulmonary source  -Ceftriaxone 2 g daily  -Doxycycline 100 mg twice daily  3/5: GPC bacteremia.  May be strep.  Consult ID.  Continue current antibiotics.  3/6: Continue antibiotics as noted above.  3/9: Resolved.

## 2025-03-09 NOTE — ASSESSMENT & PLAN NOTE
Sepsis 2/2 Secondary Bacterial Pneumonia  -On admission, met SIRS criteria with evidence of pulmonary source  -Ceftriaxone 2 g daily  -Doxycycline 100 mg twice daily  3/4: Blood cultures are negative at day 1.  No urinary antigens were sent.  Suspect pneumococcal pneumonia.  3/5: Blood cultures now growing GPC in pairs and chains.  Could be pneumococcal pneumonia.  Awaiting results.  Remains on ceftriaxone.  Consult ID.  Consider 2D echo if staph.  3/6: Identified as pneumococcal pneumonia.  Sensitive to all drugs tested.  Doxycycline has been discontinued per ID.  Appreciate ID recommendations.  Would likely require another 24 to 48 hours of antibiotic treatment.  3/7: Repeat cultures are negative at day 1.  Continue ceftriaxone.  Suspect okay for oral medications on discharge.  Await input from ID.  3/8: Repeat cultures negative at day 2.  Per ID can discharge home on amoxicillin through 3/30.  Will need follow-up chest x-ray in 3 to 4 weeks.  3/9: Successfully weaned to room air.  Appreciate ID input.  Will discharge on amoxicillin 1000 mg 3 times daily through 3/20.  Needs follow-up chest x-ray in 3 to 4 weeks.  Add some bronchial plugging which appears to resolved.  Cough is no longer productive and patient is wheezing easily.

## 2025-03-09 NOTE — CARE PLAN
The patient's goals for the shift include      The clinical goals for the shift include patient will be free from falls.    Over the shift, the patient remains free from falls.

## 2025-03-09 NOTE — CARE PLAN
Problem: Respiratory  Goal: Clear secretions with interventions this shift  Outcome: Met  Goal: Minimize anxiety/maximize coping throughout shift  Outcome: Met  Goal: Minimal/no exertional discomfort or dyspnea this shift  Outcome: Met  Goal: No signs of respiratory distress (eg. Use of accessory muscles. Peds grunting)  Outcome: Met  Goal: Patent airway maintained this shift  Outcome: Met  Goal: Verbalize decreased shortness of breath this shift  Outcome: Progressing  Goal: Wean oxygen to maintain O2 saturation per order/standard this shift  Outcome: Progressing  Goal: Increase self care and/or family involvement in next 24 hours  Outcome: Progressing     Problem: Fall/Injury  Goal: Not fall by end of shift  Outcome: Progressing  Goal: Be free from injury by end of the shift  Outcome: Progressing  Goal: Verbalize understanding of personal risk factors for fall in the hospital  Outcome: Met  Goal: Verbalize understanding of risk factor reduction measures to prevent injury from fall in the home  Outcome: Met  Goal: Use assistive devices by end of the shift  Outcome: Progressing  Goal: Pace activities to prevent fatigue by end of the shift  Outcome: Progressing      The clinical goals for the shift include will remain free from respiratory distress this shift.

## 2025-03-09 NOTE — NURSING NOTE
Patient verbalizesxunderstanding of discharge instructions and home meds. Patient is D/C'd home via wheel chair. No s/sx of distress noted.

## 2025-03-09 NOTE — ASSESSMENT & PLAN NOTE
-On adm, needing 2 L O2 to keep sats greater than 90%  -Suspect secondary to influenza and bacterial pneumonia below, wean O2 as able  3/4: Wean O2 as able.  3/4: Remains on 2 L.  3/6: Currently on 3 L O2.  Seems to vacillate between 2 to 3 L.  Wean as able.  3/7: Remains on 3 L.  Noted to desaturate with activity.  Suspect may need O2 on discharge.  3/8: Remains on 1 to 2 L O2 by NC.  May need to assess for home O2.  3/9: Has weaned successfully to room air.

## 2025-03-09 NOTE — DISCHARGE SUMMARY
Discharge Diagnosis  Assessment & Plan  Influenza A  Tamiflu 75 mg twice daily  Pneumonia of right middle lobe due to Streptococcus pneumoniae (CMS-[facility])  Sepsis 2/2 Secondary Bacterial Pneumonia  -On admission, met SIRS criteria with evidence of pulmonary source  -Ceftriaxone 2 g daily  -Doxycycline 100 mg twice daily  3/4: Blood cultures are negative at day 1.  No urinary antigens were sent.  Suspect pneumococcal pneumonia.  3/5: Blood cultures now growing GPC in pairs and chains.  Could be pneumococcal pneumonia.  Awaiting results.  Remains on ceftriaxone.  Consult ID.  Consider 2D echo if staph.  3/6: Identified as pneumococcal pneumonia.  Sensitive to all drugs tested.  Doxycycline has been discontinued per ID.  Appreciate ID recommendations.  Would likely require another 24 to 48 hours of antibiotic treatment.  3/7: Repeat cultures are negative at day 1.  Continue ceftriaxone.  Suspect okay for oral medications on discharge.  Await input from ID.  3/8: Repeat cultures negative at day 2.  Per ID can discharge home on amoxicillin through 3/30.  Will need follow-up chest x-ray in 3 to 4 weeks.  3/9: Successfully weaned to room air.  Appreciate ID input.  Will discharge on amoxicillin 1000 mg 3 times daily through 3/20.  Needs follow-up chest x-ray in 3 to 4 weeks.  Add some bronchial plugging which appears to resolved.  Cough is no longer productive and patient is wheezing easily.  Acute hypoxemic respiratory failure (Multi)  -On adm, needing 2 L O2 to keep sats greater than 90%  -Suspect secondary to influenza and bacterial pneumonia below, wean O2 as able  3/4: Wean O2 as able.  3/4: Remains on 2 L.  3/6: Currently on 3 L O2.  Seems to vacillate between 2 to 3 L.  Wean as able.  3/7: Remains on 3 L.  Noted to desaturate with activity.  Suspect may need O2 on discharge.  3/8: Remains on 1 to 2 L O2 by NC.  May need to assess for home O2.  3/9: Has weaned successfully to room air.  Sepsis (Multi)  Sepsis 2/2  Secondary pneumococcal pneumonia  -On admission, met SIRS criteria with evidence of pulmonary source  -Ceftriaxone 2 g daily  -Doxycycline 100 mg twice daily  3/5: GPC bacteremia.  May be strep.  Consult ID.  Continue current antibiotics.  3/6: Continue antibiotics as noted above.  3/9: Resolved.  Troponin level elevated  Type II MI: Suspect demand in setting of above, treatment per above.  Follow-up with PCP as outpatient.  Essential hypertension  Home meds  Hyperlipidemia  Home meds  Hypercholesterolemia  Home meds  Hypokalemia  Noted to be slightly low.  Replace and recheck.  3/8: Replaced again.  Close to normal.         Issues Requiring Follow-Up  Follow-up PCP in 1 to 2 weeks.  Recommend follow-up chest x-ray in 3 to 4 weeks.    Test Results Pending At Discharge  Pending Labs       Order Current Status    Blood Culture Preliminary result    Blood Culture Preliminary result            Hospital Course   Jenifer Means is a 79 y.o.-year-old with hx of hypertension and suspected dementia presents with generalized weakness and cough.  Patient was in her normal state of health until about 2 weeks ago when her daughter started noticing that she was weaker than usual and has been coughing.  This is gotten progressively worse since then.  Today patient went to get out of bed and slid down onto the floor.  Did not actually fall.  Did not hit her head.  No LOC event.  Patient currently denies shortness of breath, fevers, or chills.  No lower extremity edema.  No sick contacts at home.  In the ED, needing 4 L O2 to keep sats greater than 90%.  Pulse 112.  WBC 13.0.  Found to be positive for influenza A.  CXR with evidence of right-sided infiltrate.  Started on antibiotics and Tamiflu and admitted to medicine.      3/4: Patient seen.  Remains on supplemental O2.  Feels fatigued, complains of myalgias.  Continue treatment for influenza A.  No urinary symptoms.  Discussed with family in room.  Will need another 24 to 48  hours of treatment.  Reassess in AM.  3/5: Patient seen.  Down to 2 L O2 by nasal cannula.  Cough is less productive.  Still feels tired.  Weak voice.  Patient is growing GPC from 2 sets of blood cultures.  Patient remains on ceftriaxone and doxycycline.  Will place consult to ID.  Await results before considering 2D echo.  3/6: Patient seen.  Remains on 2 to 3 L O2 by NC.  Blood cultures identified as pneumococcal pneumonia.  Pansensitive.  Appreciate input from ID.  Doxycycline discontinued.  Continue ceftriaxone.  Likely require another 24 to 48 hours treatment.  Discussed with patient.  3/7: Patient seen.  Remains on 3 L O2 by NC, nursing notes she needs more when she is up and active.  Has been able to move around the room somewhat independently.  Begin discharge planning.  Repeat cultures are negative at day 1.  Suspect may need home O2 on discharge and oral antibiotics.  Appreciate input from ID.  3/8: Patient seen.  Currently on 1 to 2 L O2 by NC.  Saturations appear to be better after pulse oximeter was adjusted.  Blood cultures are negative x 2 days on repeat set.  Discussed with ID, okay to discharge home on amoxicillin.  May need home O2 for set up.  Patient states her  is sick and wants me to speak to another family member.  Left a message.  Awaiting contact from family regarding discharge planning.  3/9: Patient seen.  Continues to do well.  No further coughing.  No further sputum production.  Has weaned successfully to room air.  Appreciate ID input, will discharge on amoxicillin 1000 mg 3 times daily through 3/20.  Okay to follow-up PCP in 1 to 2 weeks.  Recommend follow-up chest x-ray in 3 to 4 weeks.  Patient appears to be doing well.    This discharge took greater than 35 minutes to arrange.    Pertinent Physical Exam At Time of Discharge  Physical Exam  Constitutional:       General: She is not in acute distress.  HENT:      Head: Normocephalic and atraumatic.      Nose: Nose normal. No  congestion or rhinorrhea.      Mouth/Throat:      Mouth: Mucous membranes are dry.      Pharynx: Oropharynx is clear.   Eyes:      General: No scleral icterus.     Extraocular Movements: Extraocular movements intact.      Pupils: Pupils are equal, round, and reactive to light.   Cardiovascular:      Rate and Rhythm: Normal rate and regular rhythm.      Heart sounds: Normal heart sounds. No murmur heard.     No friction rub. No gallop.   Pulmonary:      Effort: Pulmonary effort is normal.      Breath sounds: Normal breath sounds. No wheezing, rhonchi or rales.   Chest:      Chest wall: No tenderness.   Abdominal:      General: There is no distension.      Palpations: Abdomen is soft.      Tenderness: There is no abdominal tenderness. There is no guarding or rebound.   Musculoskeletal:         General: No swelling, tenderness or signs of injury. Normal range of motion.      Cervical back: Normal range of motion.   Skin:     General: Skin is warm and dry.      Coloration: Skin is not jaundiced.      Findings: No bruising, erythema or rash.   Neurological:      General: No focal deficit present.      Mental Status: She is alert and oriented to person, place, and time.         Home Medications     Medication List      START taking these medications     amoxicillin 500 mg capsule; Commonly known as: Amoxil; Take 2 capsules   (1,000 mg) by mouth 3 times a day for 12 days.     CHANGE how you take these medications     * albuterol 90 mcg/actuation inhaler; Inhale 2 puffs every 4 hours if   needed for wheezing.; What changed: Another medication with the same name   was added. Make sure you understand how and when to take each.   * albuterol 90 mcg/actuation inhaler; Commonly known as: ProAir HFA;   Inhale 2 puffs every 4 hours if needed for wheezing or shortness of   breath.; What changed: You were already taking a medication with the same   name, and this prescription was added. Make sure you understand how and   when to  take each.  * This list has 2 medication(s) that are the same as other medications   prescribed for you. Read the directions carefully, and ask your doctor or   other care provider to review them with you.     CONTINUE taking these medications     aspirin 81 mg chewable tablet   cyanocobalamin 1,000 mcg tablet; Commonly known as: Vitamin B-12   hydroCHLOROthiazide 12.5 mg capsule; Commonly known as: Microzide   lisinopril 40 mg tablet   pantoprazole 40 mg EC tablet; Commonly known as: ProtoNix   simvastatin 20 mg tablet; Commonly known as: Zocor     STOP taking these medications     lidocaine 5 % patch; Commonly known as: Lidoderm   methocarbamol 500 mg tablet; Commonly known as: Robaxin       Outpatient Follow-Up  No future appointments.    Jones Espinosa MD

## 2025-03-09 NOTE — DISCHARGE INSTRUCTIONS
Follow up with PCP in 1-2 weeks. Call to schedule. Bring all your discharge paperwork and medications when you go to your follow up appointment. Return to ED if your symptoms come back.    Recommend follow-up chest x-ray in 3 to 4 weeks

## 2025-03-09 NOTE — PROGRESS NOTES
[facility] INFECTIOUS DISEASE PROGRESS NOTE    Patient Name: Jenifer Means  MRN: [id number]    INTERVAL HISTORY:   No fevers. Feels better. Remains on 2 to 3 L O2 by NC.     Patient Active Problem List   Diagnosis    Influenza A    Acute hypoxemic respiratory failure (Multi)    Pneumonia of right middle lobe due to Streptococcus pneumoniae (CMS-HCC)    Sepsis (Multi)    Troponin level elevated    Essential hypertension    Hyperlipidemia    Hypercholesterolemia    Low back pain    Diverticular disease of colon    Hypokalemia        ASSESSMENT:   Sepsis  CAP w strep pneumoniae  Pneumocococcal bacteremia  Influenza A  Acute hypoxic resp failure  HTN  HLD     Plan  Ceftriaxone  Tamiflu  Repeat Bcx x 2 negative x 1 day  Follow O2 levels  Rev MICs of the Bcx isolate. PSSP present    Discharge planning on PO Amoxilillin 1000 mg TID till 3/20/25    MEDICATIONS: reviewed.    Current Facility-Administered Medications:     acetaminophen (Tylenol) tablet 650 mg, 650 mg, oral, q4h PRN, 650 mg at 03/08/25 1526 **OR** acetaminophen (Tylenol) oral liquid 650 mg, 650 mg, nasogastric tube, q4h PRN **OR** acetaminophen (Tylenol) suppository 650 mg, 650 mg, rectal, q4h PRN, Con Elkins MD    alum-mag hydroxide-simeth (Mylanta) 200-200-20 mg/5 mL oral suspension 30 mL, 30 mL, oral, q6h PRN, Con Elkins MD    aspirin chewable tablet 81 mg, 81 mg, oral, Daily, Jones Espinosa MD, 81 mg at 03/08/25 0834    benzocaine-menthol (Cepastat Sore Throat) lozenge 1 lozenge, 1 lozenge, Mouth/Throat, q2h PRN, Con Elkins MD    cefTRIAXone (Rocephin) 2 g in dextrose (iso) IV 50 mL, 2 g, intravenous, q24h, Con Elkins MD, Stopped at 03/08/25 0930    cyanocobalamin (Vitamin B-12) tablet 1,000 mcg, 1,000 mcg, oral, Daily, Jones Espinosa MD, 1,000 mcg at 03/08/25 0835    dextromethorphan-guaifenesin (Robitussin DM)  mg/5 mL oral liquid 5 mL, 5 mL, oral, q4h PRN, Con Elkins MD    enoxaparin (Lovenox) syringe 40 mg, 40  "mg, subcutaneous, q24h, Jones Espinosa MD, 40 mg at 25 1647    guaiFENesin (Mucinex) 12 hr tablet 600 mg, 600 mg, oral, q12h PRN, Con Elkins MD    hydroCHLOROthiazide (Microzide) capsule 12.5 mg, 12.5 mg, oral, q24h, Jones Espinosa MD, 12.5 mg at 25 0931    ipratropium-albuteroL (Duo-Neb) 0.5-2.5 mg/3 mL nebulizer solution 3 mL, 3 mL, nebulization, q4h PRN, Con Elkins MD    ipratropium-albuteroL (Duo-Neb) 0.5-2.5 mg/3 mL nebulizer solution 3 mL, 3 mL, nebulization, TID, Con Elkins MD, 3 mL at 25 1104    lisinopril tablet 40 mg, 40 mg, oral, Daily, Jones Espinosa MD, 40 mg at 25 0834    melatonin tablet 3 mg, 3 mg, oral, Nightly PRN, Con Elkins MD    nystatin (Mycostatin) 100,000 unit/mL suspension 500,000 Units, 5 mL, Swish & Swallow, 4x daily, Jones Espinosa MD, 500,000 Units at 25 1453    ondansetron (Zofran) tablet 4 mg, 4 mg, oral, q8h PRN **OR** ondansetron (Zofran) injection 4 mg, 4 mg, intravenous, q8h PRN, Con Elkins MD    oseltamivir (Tamiflu) capsule 75 mg, 75 mg, oral, BID, Jones Espinosa MD, 75 mg at 25 0834    oxygen (O2) therapy, , inhalation, Continuous - Inhalation, Jones Espinosa MD, Last Rate: 120,000 mL/hr at 25 1104, 2 L/min at 25 1104    pantoprazole (ProtoNix) EC tablet 40 mg, 40 mg, oral, Daily, Jones Espinosa MD, 40 mg at 25 0834    polyethylene glycol (Glycolax, Miralax) packet 17 g, 17 g, oral, BID PRN, Con Elkins MD    simvastatin (Zocor) tablet 40 mg, 40 mg, oral, Nightly, Jones Espinosa MD, 40 mg at 25 2079     PHYSICAL EXAM:  Vital signs: /82 (BP Location: Left arm, Patient Position: Sitting)   Pulse 104   Temp 36.4 °C (97.6 °F) (Temporal)   Resp 17   Ht 1.651 m (5' 5\")   Wt 79.5 kg (175 lb 4.3 oz)   SpO2 90%   BMI 29.17 kg/m²   Temp (24hrs), Av.6 °C (97.9 °F), Min:36.3 °C (97.3 °F), Max:36.9 °C (98.5 °F)    General: alert, oriented, NAD  Lungs: bilaterally clear to auscultation  Heart: regular " rate and rhythm  Abdomen: soft, non tender, non distended, BS+  Extremities: no edema  No rashes  No joint inflammation  Neck supple  Lines ok  No CVAT              Labs:    Results for orders placed or performed during the hospital encounter of 03/03/25 (from the past 96 hours)   Basic Metabolic Panel   Result Value Ref Range    Glucose 90 74 - 99 mg/dL    Sodium 138 136 - 145 mmol/L    Potassium 4.1 3.5 - 5.3 mmol/L    Chloride 94 (L) 98 - 107 mmol/L    Bicarbonate 33 (H) 21 - 32 mmol/L    Anion Gap 15 10 - 20 mmol/L    Urea Nitrogen 16 6 - 23 mg/dL    Creatinine 0.84 0.50 - 1.05 mg/dL    eGFR 72 >60 mL/min/1.73m*2    Calcium 9.2 8.6 - 10.3 mg/dL   CBC   Result Value Ref Range    WBC 14.2 (H) 4.4 - 11.3 x10*3/uL    nRBC 0.0 0.0 - 0.0 /100 WBCs    RBC 4.28 4.00 - 5.20 x10*6/uL    Hemoglobin 11.1 (L) 12.0 - 16.0 g/dL    Hematocrit 35.3 (L) 36.0 - 46.0 %    MCV 83 80 - 100 fL    MCH 25.9 (L) 26.0 - 34.0 pg    MCHC 31.4 (L) 32.0 - 36.0 g/dL    RDW 14.1 11.5 - 14.5 %    Platelets 426 150 - 450 x10*3/uL   Blood Culture    Specimen: Peripheral Venipuncture; Blood culture   Result Value Ref Range    Blood Culture No growth at 2 days    Blood Culture    Specimen: Peripheral Venipuncture; Blood culture   Result Value Ref Range    Blood Culture No growth at 2 days    CBC   Result Value Ref Range    WBC 9.3 4.4 - 11.3 x10*3/uL    nRBC 0.0 0.0 - 0.0 /100 WBCs    RBC 3.86 (L) 4.00 - 5.20 x10*6/uL    Hemoglobin 10.3 (L) 12.0 - 16.0 g/dL    Hematocrit 32.2 (L) 36.0 - 46.0 %    MCV 83 80 - 100 fL    MCH 26.7 26.0 - 34.0 pg    MCHC 32.0 32.0 - 36.0 g/dL    RDW 14.4 11.5 - 14.5 %    Platelets 453 (H) 150 - 450 x10*3/uL   Basic Metabolic Panel   Result Value Ref Range    Glucose 98 74 - 99 mg/dL    Sodium 135 (L) 136 - 145 mmol/L    Potassium 3.0 (L) 3.5 - 5.3 mmol/L    Chloride 95 (L) 98 - 107 mmol/L    Bicarbonate 35 (H) 21 - 32 mmol/L    Anion Gap 8 (L) 10 - 20 mmol/L    Urea Nitrogen 13 6 - 23 mg/dL    Creatinine 0.78 0.50 - 1.05  mg/dL    eGFR 78 >60 mL/min/1.73m*2    Calcium 8.6 8.6 - 10.3 mg/dL   Basic metabolic panel   Result Value Ref Range    Glucose 121 (H) 74 - 99 mg/dL    Sodium 134 (L) 136 - 145 mmol/L    Potassium 3.4 (L) 3.5 - 5.3 mmol/L    Chloride 94 (L) 98 - 107 mmol/L    Bicarbonate 35 (H) 21 - 32 mmol/L    Anion Gap 8 (L) 10 - 20 mmol/L    Urea Nitrogen 8 6 - 23 mg/dL    Creatinine 0.76 0.50 - 1.05 mg/dL    eGFR 81 >60 mL/min/1.73m*2    Calcium 8.7 8.6 - 10.3 mg/dL        Microbiology data: reviewed    Imaging data: reviewed      Navi Loo  Pager:[phone]  Date of service: 3/8/2025  Time of service: 5:46 PM

## 2025-03-10 LAB
BACTERIA BLD CULT: NORMAL
BACTERIA BLD CULT: NORMAL

## 2025-04-24 ENCOUNTER — HOSPITAL ENCOUNTER (OUTPATIENT)
Dept: RADIOLOGY | Facility: HOSPITAL | Age: 80
Discharge: HOME | End: 2025-04-24
Payer: MEDICARE

## 2025-04-24 DIAGNOSIS — J13 PNEUMONIA DUE TO STREPTOCOCCUS PNEUMONIAE: ICD-10-CM

## 2025-04-24 PROCEDURE — 71046 X-RAY EXAM CHEST 2 VIEWS: CPT
